# Patient Record
Sex: FEMALE | Race: WHITE | URBAN - NONMETROPOLITAN AREA
[De-identification: names, ages, dates, MRNs, and addresses within clinical notes are randomized per-mention and may not be internally consistent; named-entity substitution may affect disease eponyms.]

---

## 2017-10-26 ENCOUNTER — OPTICAL OFFICE (OUTPATIENT)
Dept: URBAN - NONMETROPOLITAN AREA CLINIC 4 | Facility: CLINIC | Age: 47
Setting detail: OPHTHALMOLOGY
End: 2017-10-26
Payer: COMMERCIAL

## 2017-10-26 ENCOUNTER — RX ONLY (RX ONLY)
Age: 47
End: 2017-10-26

## 2017-10-26 ENCOUNTER — DOCTOR'S OFFICE (OUTPATIENT)
Dept: URBAN - NONMETROPOLITAN AREA CLINIC 1 | Facility: CLINIC | Age: 47
Setting detail: OPHTHALMOLOGY
End: 2017-10-26
Payer: COMMERCIAL

## 2017-10-26 DIAGNOSIS — H10.13: ICD-10-CM

## 2017-10-26 DIAGNOSIS — H25.13: ICD-10-CM

## 2017-10-26 DIAGNOSIS — H52.223: ICD-10-CM

## 2017-10-26 DIAGNOSIS — G44.1: ICD-10-CM

## 2017-10-26 DIAGNOSIS — H52.4: ICD-10-CM

## 2017-10-26 PROCEDURE — V2020 VISION SVCS FRAMES PURCHASES: HCPCS | Performed by: OPTOMETRIST

## 2017-10-26 PROCEDURE — 92015 DETERMINE REFRACTIVE STATE: CPT | Performed by: OPTOMETRIST

## 2017-10-26 PROCEDURE — V2025 EYEGLASSES DELUX FRAMES: HCPCS | Performed by: OPTOMETRIST

## 2017-10-26 PROCEDURE — 92004 COMPRE OPH EXAM NEW PT 1/>: CPT | Performed by: OPTOMETRIST

## 2017-10-26 PROCEDURE — V2202 LENS SPHERE BIFOCAL 7.12-20.: HCPCS | Performed by: OPTOMETRIST

## 2017-10-26 PROCEDURE — V2784 LENS POLYCARB OR EQUAL: HCPCS | Performed by: OPTOMETRIST

## 2017-10-26 ASSESSMENT — REFRACTION_OUTSIDERX
OS_VA2: 20/20
OS_VA1: 20/20
OD_ADD: +1.75
OD_AXIS: 090
OD_CYLINDER: -0.75
OD_VA3: 20/
OS_CYLINDER: -0.75
OS_AXIS: 090
OD_VA1: 20/20
OS_ADD: +1.75
OD_VA2: 20/20
OU_VA: 20/20-2
OS_SPHERE: PL
OD_SPHERE: PL
OS_VA3: 20/

## 2017-10-26 ASSESSMENT — REFRACTION_CURRENTRX
OS_OVR_VA: 20/
OS_SPHERE: +1.75
OD_VPRISM_DIRECTION: SV
OS_OVR_VA: 20/
OD_OVR_VA: 20/
OD_OVR_VA: 20/
OD_SPHERE: +1.75
OD_OVR_VA: 20/
OS_OVR_VA: 20/
OS_VPRISM_DIRECTION: SV

## 2017-10-26 ASSESSMENT — CONFRONTATIONAL VISUAL FIELD TEST (CVF)
OS_FINDINGS: FULL
OD_FINDINGS: FULL

## 2017-10-26 ASSESSMENT — REFRACTION_MANIFEST
OU_VA: 20/
OD_VA3: 20/
OS_VA3: 20/
OD_VA3: 20/
OS_VA2: 20/
OD_VA1: 20/
OS_VA3: 20/
OU_VA: 20/
OS_VA1: 20/
OD_VA1: 20/
OS_VA2: 20/
OD_VA2: 20/
OD_VA2: 20/
OS_VA1: 20/

## 2017-10-26 ASSESSMENT — REFRACTION_AUTOREFRACTION
OD_AXIS: 090
OS_AXIS: 091
OS_SPHERE: 0.00
OD_CYLINDER: -0.75
OS_CYLINDER: -0.75
OD_SPHERE: +0.50

## 2017-10-26 ASSESSMENT — SPHEQUIV_DERIVED
OS_SPHEQUIV: -0.375
OD_SPHEQUIV: 0.125

## 2017-10-26 ASSESSMENT — VISUAL ACUITY
OD_BCVA: 20/40+2
OS_BCVA: 20/40+1

## 2017-12-27 ENCOUNTER — OPTICAL OFFICE (OUTPATIENT)
Dept: URBAN - NONMETROPOLITAN AREA CLINIC 4 | Facility: CLINIC | Age: 47
Setting detail: OPHTHALMOLOGY
End: 2017-12-27
Payer: COMMERCIAL

## 2017-12-27 DIAGNOSIS — H52.223: ICD-10-CM

## 2017-12-27 PROCEDURE — V2762 POLARIZATION, ANY LENS: HCPCS | Performed by: OPTOMETRIST

## 2017-12-27 PROCEDURE — V2020 VISION SVCS FRAMES PURCHASES: HCPCS | Performed by: OPTOMETRIST

## 2017-12-27 PROCEDURE — V2784 LENS POLYCARB OR EQUAL: HCPCS | Performed by: OPTOMETRIST

## 2017-12-27 PROCEDURE — V2202 LENS SPHERE BIFOCAL 7.12-20.: HCPCS | Performed by: OPTOMETRIST

## 2017-12-27 PROCEDURE — V2025 EYEGLASSES DELUX FRAMES: HCPCS | Performed by: OPTOMETRIST

## 2019-02-12 ENCOUNTER — DOCTOR'S OFFICE (OUTPATIENT)
Dept: URBAN - NONMETROPOLITAN AREA CLINIC 1 | Facility: CLINIC | Age: 49
Setting detail: OPHTHALMOLOGY
End: 2019-02-12
Payer: COMMERCIAL

## 2019-02-12 ENCOUNTER — OPTICAL OFFICE (OUTPATIENT)
Dept: URBAN - NONMETROPOLITAN AREA CLINIC 4 | Facility: CLINIC | Age: 49
Setting detail: OPHTHALMOLOGY
End: 2019-02-12
Payer: COMMERCIAL

## 2019-02-12 DIAGNOSIS — H40.033: ICD-10-CM

## 2019-02-12 DIAGNOSIS — H52.223: ICD-10-CM

## 2019-02-12 DIAGNOSIS — H10.13: ICD-10-CM

## 2019-02-12 DIAGNOSIS — G44.1: ICD-10-CM

## 2019-02-12 DIAGNOSIS — H25.13: ICD-10-CM

## 2019-02-12 DIAGNOSIS — H52.4: ICD-10-CM

## 2019-02-12 PROCEDURE — 92132 CPTRZD OPH DX IMG ANT SGM: CPT | Performed by: OPTOMETRIST

## 2019-02-12 PROCEDURE — 92015 DETERMINE REFRACTIVE STATE: CPT | Performed by: OPTOMETRIST

## 2019-02-12 PROCEDURE — V2203 LENS SPHCYL BIFOCAL 4.00D/.1: HCPCS | Performed by: OPTOMETRIST

## 2019-02-12 PROCEDURE — V2020 VISION SVCS FRAMES PURCHASES: HCPCS | Performed by: OPTOMETRIST

## 2019-02-12 PROCEDURE — V2202 LENS SPHERE BIFOCAL 7.12-20.: HCPCS | Performed by: OPTOMETRIST

## 2019-02-12 PROCEDURE — 92310 CONTACT LENS FITTING OU: CPT | Performed by: OPTOMETRIST

## 2019-02-12 PROCEDURE — 92014 COMPRE OPH EXAM EST PT 1/>: CPT | Performed by: OPTOMETRIST

## 2019-02-12 PROCEDURE — V2784 LENS POLYCARB OR EQUAL: HCPCS | Performed by: OPTOMETRIST

## 2019-02-12 ASSESSMENT — REFRACTION_CURRENTRX
OD_CYLINDER: -0.75
OS_SPHERE: PLANO
OD_OVR_VA: 20/
OD_OVR_VA: 20/
OS_OVR_VA: 20/
OD_ADD: +1.75
OD_SPHERE: PLANO
OS_ADD: +1.75
OS_AXIS: 092
OD_AXIS: 094
OD_OVR_VA: 20/
OS_CYLINDER: -0.75
OS_OVR_VA: 20/
OS_VPRISM_DIRECTION: BF
OD_VPRISM_DIRECTION: BF
OS_OVR_VA: 20/

## 2019-02-12 ASSESSMENT — CONFRONTATIONAL VISUAL FIELD TEST (CVF)
OS_FINDINGS: FULL
OD_FINDINGS: FULL

## 2019-02-12 ASSESSMENT — REFRACTION_MANIFEST
OS_CYLINDER: -1.25
OD_VA3: 20/
OD_CYLINDER: -1.00
OS_AXIS: 095
OS_VA3: 20/
OD_ADD: +2.00
OS_VA2: 20/20
OD_AXIS: 090
OD_VA2: 20/
OD_VA3: 20/
OD_SPHERE: +0.50
OS_VA1: 20/
OD_VA1: 20/20
OS_ADD: +2.00
OS_SPHERE: +0.25
OU_VA: 20/20-2
OS_VA1: 20/20
OD_VA1: 20/
OD_VA2: 20/20
OS_VA2: 20/
OS_VA3: 20/
OU_VA: 20/

## 2019-02-12 ASSESSMENT — VISUAL ACUITY
OS_BCVA: 20/25-1
OD_BCVA: 20/25+2

## 2019-02-12 ASSESSMENT — SPHEQUIV_DERIVED
OS_SPHEQUIV: -0.375
OD_SPHEQUIV: 0.25
OS_SPHEQUIV: -0.375
OD_SPHEQUIV: 0

## 2019-02-12 ASSESSMENT — REFRACTION_AUTOREFRACTION
OD_AXIS: 062
OD_CYLINDER: -0.50
OS_CYLINDER: -1.25
OS_AXIS: 098
OS_SPHERE: +0.25
OD_SPHERE: +0.50

## 2019-04-25 ENCOUNTER — OPTICAL OFFICE (OUTPATIENT)
Dept: URBAN - NONMETROPOLITAN AREA CLINIC 4 | Facility: CLINIC | Age: 49
Setting detail: OPHTHALMOLOGY
End: 2019-04-25
Payer: COMMERCIAL

## 2019-04-25 DIAGNOSIS — H52.223: ICD-10-CM

## 2019-04-25 PROCEDURE — V2203 LENS SPHCYL BIFOCAL 4.00D/.1: HCPCS | Performed by: OPTOMETRIST

## 2019-04-25 PROCEDURE — V2020 VISION SVCS FRAMES PURCHASES: HCPCS | Performed by: OPTOMETRIST

## 2019-04-25 PROCEDURE — V2202 LENS SPHERE BIFOCAL 7.12-20.: HCPCS | Performed by: OPTOMETRIST

## 2019-04-25 PROCEDURE — V2762 POLARIZATION, ANY LENS: HCPCS | Performed by: OPTOMETRIST

## 2019-08-12 ENCOUNTER — DOCTOR'S OFFICE (OUTPATIENT)
Dept: URBAN - NONMETROPOLITAN AREA CLINIC 1 | Facility: CLINIC | Age: 49
Setting detail: OPHTHALMOLOGY
End: 2019-08-12
Payer: COMMERCIAL

## 2019-08-12 DIAGNOSIS — H25.13: ICD-10-CM

## 2019-08-12 DIAGNOSIS — H40.033: ICD-10-CM

## 2019-08-12 DIAGNOSIS — G44.1: ICD-10-CM

## 2019-08-12 PROCEDURE — 92012 INTRM OPH EXAM EST PATIENT: CPT | Performed by: OPTOMETRIST

## 2019-08-12 PROCEDURE — 92132 CPTRZD OPH DX IMG ANT SGM: CPT | Performed by: OPTOMETRIST

## 2019-08-12 ASSESSMENT — REFRACTION_MANIFEST
OS_VA3: 20/
OS_AXIS: 095
OS_ADD: +2.00
OS_VA2: 20/20
OS_VA1: 20/
OS_VA3: 20/
OD_VA1: 20/20
OS_VA2: 20/
OD_CYLINDER: -1.00
OU_VA: 20/20-2
OD_AXIS: 090
OD_VA2: 20/
OS_SPHERE: +0.25
OD_VA1: 20/
OD_VA2: 20/20
OS_VA1: 20/20
OD_ADD: +2.00
OU_VA: 20/
OD_VA3: 20/
OS_CYLINDER: -1.25
OD_SPHERE: +0.50
OD_VA3: 20/

## 2019-08-12 ASSESSMENT — SPHEQUIV_DERIVED
OS_SPHEQUIV: -0.375
OD_SPHEQUIV: -0.125
OS_SPHEQUIV: -0.25
OD_SPHEQUIV: 0

## 2019-08-12 ASSESSMENT — REFRACTION_CURRENTRX
OD_AXIS: 094
OD_SPHERE: PLANO
OS_CYLINDER: -0.75
OS_OVR_VA: 20/
OD_OVR_VA: 20/
OS_VPRISM_DIRECTION: BF
OS_OVR_VA: 20/
OD_VPRISM_DIRECTION: BF
OD_ADD: +1.75
OD_CYLINDER: -0.75
OD_OVR_VA: 20/
OS_OVR_VA: 20/
OD_OVR_VA: 20/
OS_ADD: +1.75
OS_AXIS: 092
OS_SPHERE: PLANO

## 2019-08-12 ASSESSMENT — CONFRONTATIONAL VISUAL FIELD TEST (CVF)
OS_FINDINGS: FULL
OD_FINDINGS: FULL

## 2019-08-12 ASSESSMENT — REFRACTION_AUTOREFRACTION
OS_AXIS: 102
OS_SPHERE: 0.00
OD_CYLINDER: -0.75
OD_SPHERE: +0.25
OS_CYLINDER: -0.50
OD_AXIS: 097

## 2019-08-12 ASSESSMENT — VISUAL ACUITY
OD_BCVA: 20/20-2
OS_BCVA: 20/20-2

## 2019-11-21 ENCOUNTER — TELEPHONE (OUTPATIENT)
Dept: OBGYN CLINIC | Facility: CLINIC | Age: 49
End: 2019-11-21

## 2019-11-22 ENCOUNTER — TELEPHONE (OUTPATIENT)
Dept: OBGYN CLINIC | Facility: HOSPITAL | Age: 49
End: 2019-11-22

## 2019-11-22 NOTE — TELEPHONE ENCOUNTER
Patient is seeing Dr Mckeon Frames  She received her referral from her PCP but it only states it is active for 10 days  I informed her she would need to contact her PCP to have it updated since her appt is 12/13  She is going to call the office

## 2019-11-25 ENCOUNTER — TELEPHONE (OUTPATIENT)
Dept: OBGYN CLINIC | Facility: HOSPITAL | Age: 49
End: 2019-11-25

## 2019-11-25 NOTE — TELEPHONE ENCOUNTER
The patient is not scheduled until 12/13  Fax should be coming over sometime today   Just wanted to let you know

## 2019-11-25 NOTE — TELEPHONE ENCOUNTER
Good Morning, Sena; I have not seen anything come threw  I also see this pt scheduled at this time for today nor Friday?

## 2019-12-12 ENCOUNTER — TELEPHONE (OUTPATIENT)
Dept: OBGYN CLINIC | Facility: CLINIC | Age: 49
End: 2019-12-12

## 2019-12-12 RX ORDER — HYDROXYZINE HYDROCHLORIDE 25 MG/1
TABLET, FILM COATED ORAL
COMMUNITY

## 2019-12-12 RX ORDER — KETOCONAZOLE 20 MG/G
CREAM TOPICAL
COMMUNITY
Start: 2017-07-05

## 2019-12-12 RX ORDER — METHYLPREDNISOLONE 4 MG/1
TABLET ORAL
COMMUNITY
Start: 2019-11-19

## 2019-12-12 RX ORDER — CITALOPRAM 20 MG/1
TABLET ORAL
COMMUNITY
Start: 2011-10-24

## 2019-12-12 RX ORDER — ESTRADIOL 0.1 MG/G
CREAM VAGINAL
COMMUNITY
Start: 2019-09-19

## 2019-12-12 RX ORDER — LORAZEPAM 1 MG/1
TABLET ORAL
COMMUNITY
Start: 2011-10-24

## 2019-12-12 RX ORDER — FLUTICASONE PROPIONATE 220 UG/1
AEROSOL, METERED RESPIRATORY (INHALATION)
COMMUNITY
Start: 2018-06-18

## 2019-12-12 RX ORDER — MULTIVITAMIN
CAPSULE ORAL
COMMUNITY

## 2019-12-12 RX ORDER — OMEPRAZOLE 40 MG/1
CAPSULE, DELAYED RELEASE ORAL
COMMUNITY
Start: 2017-04-20

## 2019-12-12 RX ORDER — ZINC GLUCONATE 50 MG
50 TABLET ORAL
COMMUNITY

## 2019-12-12 RX ORDER — UBIDECARENONE 10 MG
100 CAPSULE ORAL DAILY
COMMUNITY

## 2019-12-12 RX ORDER — UBIDECARENONE 75 MG
CAPSULE ORAL
COMMUNITY
Start: 2013-04-22

## 2019-12-12 RX ORDER — DICLOFENAC SODIUM 75 MG/1
TABLET, DELAYED RELEASE ORAL
COMMUNITY
Start: 2016-06-06

## 2019-12-12 RX ORDER — METOCLOPRAMIDE 5 MG/1
TABLET ORAL
COMMUNITY
Start: 2016-11-29

## 2019-12-12 RX ORDER — TRIAMCINOLONE ACETONIDE 1 MG/G
CREAM TOPICAL
COMMUNITY
Start: 2019-09-30

## 2019-12-13 ENCOUNTER — APPOINTMENT (OUTPATIENT)
Dept: RADIOLOGY | Facility: CLINIC | Age: 49
End: 2019-12-13
Payer: COMMERCIAL

## 2019-12-13 ENCOUNTER — OFFICE VISIT (OUTPATIENT)
Dept: OBGYN CLINIC | Facility: CLINIC | Age: 49
End: 2019-12-13
Payer: COMMERCIAL

## 2019-12-13 VITALS
SYSTOLIC BLOOD PRESSURE: 112 MMHG | WEIGHT: 166.89 LBS | DIASTOLIC BLOOD PRESSURE: 76 MMHG | HEIGHT: 62 IN | BODY MASS INDEX: 30.71 KG/M2

## 2019-12-13 DIAGNOSIS — M54.16 LUMBAR RADICULOPATHY: ICD-10-CM

## 2019-12-13 DIAGNOSIS — M54.16 LUMBAR RADICULOPATHY: Primary | ICD-10-CM

## 2019-12-13 DIAGNOSIS — M47.26 OTHER SPONDYLOSIS WITH RADICULOPATHY, LUMBAR REGION: ICD-10-CM

## 2019-12-13 DIAGNOSIS — M79.604 RIGHT LEG PAIN: ICD-10-CM

## 2019-12-13 PROCEDURE — 99204 OFFICE O/P NEW MOD 45 MIN: CPT | Performed by: ORTHOPAEDIC SURGERY

## 2019-12-13 PROCEDURE — 72110 X-RAY EXAM L-2 SPINE 4/>VWS: CPT

## 2019-12-13 NOTE — PROGRESS NOTES
ASSESSMENT/PLAN:    Diagnoses and all orders for this visit:    Right leg pain      Lumbar radiculopathy    Lumbar spondylosis with radiculopathy      Plan:  I had a lengthy discussion with Jackelin Morillo and her   At this time, I think a course of physical therapy would be appropriate  She will follow-up with Dr Charis Hanson in 4-6 weeks  If symptoms are not responding, additional workup may be necessary  She should continue the diclofenac which she has been taking for a prolonged period of time, several years  She underwent a trial of Medrol Dosepak about 2 weeks ago without benefit and I would not recommend repeating this, since it did not help  She may add Tylenol to her over-the-counter regimen  I have encouraged her to contact the office if questions or concerns arise  Return for 4 - 6 weeks with Dr Charis Hanson       _____________________________________________________  CHIEF COMPLAINT:  Chief Complaint   Patient presents with    Right Knee - Pain    Right Hip - Pain         SUBJECTIVE:  Nicolasa Ferreira is a 51y o  year old female who presents for evaluation of right lower extremity pain  She states the pain begins at her hip and radiates distally to her ankle, occasionally into her foot  She denies lower extremity paresthesias  Symptoms have been present now for about 2 months  She denies any injury triggering her symptoms  Localizing her pain, she circumferentially grabs the proximal thigh, traces pain down to the medial, distal thigh and then radiating around to the anterior lower leg and then the lateral aspect of the distal lower leg and ankle  She does note some back pain and has had some increase in her back pain as of the last week or so  She states that she has previously been treated for sciatica and does not see feel the symptoms are similar  An MRI of her knee was obtained  She was referred for orthopedic consultation and treatment  She has had no specific treatment    She was offered an injection by her primary care physician but shows to seek orthopedic evaluation prior to injection  She was treated several years ago for symptoms of a similar nature that was ultimately diagnosed as a result of her dropped arch        PAST MEDICAL HISTORY:  Past Medical History:   Diagnosis Date    Beta 0 thalassemia (HCC)     Esophageal abnormality     Gets scopes every 4 months       PAST SURGICAL HISTORY:  Past Surgical History:   Procedure Laterality Date    ELBOW SURGERY      ESOPHAGOSCOPY      FOOT SURGERY      HAND SURGERY      HYSTERECTOMY      TRIGGER FINGER RELEASE      WISDOM TOOTH EXTRACTION         FAMILY HISTORY:  Family History   Problem Relation Age of Onset    Hypertension Mother     Dementia Mother     Osteoarthritis Mother     Alzheimer's disease Mother     Esophageal cancer Father        SOCIAL HISTORY:  Social History     Tobacco Use    Smoking status: Current Every Day Smoker     Packs/day: 1 00     Years: 30 00     Pack years: 30 00     Types: Cigarettes    Smokeless tobacco: Current User   Substance Use Topics    Alcohol use: Yes     Frequency: Monthly or less    Drug use: Not on file       MEDICATIONS:    Current Outpatient Medications:     Ascorbic Acid, Vitamin C, (VITAMIN C) 100 MG tablet, Take 500 mg by mouth daily, Disp: , Rfl:     Calcium Carb-Cholecalciferol 1000-800 MG-UNIT TABS, , Disp: , Rfl:     citalopram (CeleXA) 20 mg tablet, Take by mouth, Disp: , Rfl:     Coenzyme Q10 10 MG capsule, Take 100 mg by mouth daily, Disp: , Rfl:     cyanocobalamin (VITAMIN B-12) 100 mcg tablet, Take by mouth, Disp: , Rfl:     diclofenac (VOLTAREN) 75 mg EC tablet, Take by mouth, Disp: , Rfl:     estradiol (ESTRACE) 0 1 mg/g vaginal cream, , Disp: , Rfl:     fluticasone (FLOVENT HFA) 220 mcg/act inhaler, Take by mouth, Disp: , Rfl:     ketoconazole (NIZORAL) 2 % cream, , Disp: , Rfl:     LORazepam (ATIVAN) 1 mg tablet, Take by mouth, Disp: , Rfl:     lysine 500 MG TABS, Take by mouth, Disp: , Rfl:     methylPREDNISolone 4 MG tablet therapy pack, follow package directions, Disp: , Rfl:     metoclopramide (REGLAN) 5 mg tablet, Take by mouth, Disp: , Rfl:     Multiple Vitamin (MULTIVITAMIN) capsule, Take by mouth, Disp: , Rfl:     omeprazole (PriLOSEC) 40 MG capsule, , Disp: , Rfl:     Zinc 50 MG TABS, Take 50 mg by mouth, Disp: , Rfl:     conjugated estrogens (PREMARIN) vaginal cream, , Disp: , Rfl:     hydrOXYzine HCL (ATARAX) 25 mg tablet, Take by mouth, Disp: , Rfl:     Pediatric Multivitamins-Fl (MULTIVITAMINS/FL PO), Take by mouth, Disp: , Rfl:     Psyllium 100 % POWD, Take by mouth, Disp: , Rfl:     triamcinolone (KENALOG) 0 1 % cream, , Disp: , Rfl:     ALLERGIES:  Allergies   Allergen Reactions    Ciprofloxacin     Sulfa Antibiotics        Review of systems:   Constitutional: Negative for fatigue, fever or loss of apetite  HENT: Negative  Respiratory: Negative for shortness of breath, dyspnea  Cardiovascular: Negative for chest pain/tightness  Gastrointestinal: Negative for abdominal pain, N/V  Endocrine: Negative for cold/heat intolerance, unexplained weight loss/gain  Genitourinary: Negative for flank pain, dysuria, hematuria  Musculoskeletal:  Positive as in the HPI   Skin: Negative for rash  Neurological:  Negative  Psychiatric/Behavioral: Negative for agitation  _____________________________________________________  PHYSICAL EXAMINATION:    Blood pressure 112/76, height 5' 2" (1 575 m), weight 75 7 kg (166 lb 14 2 oz)  General: well developed and well nourished, alert, oriented times 3 and appears comfortable  Psychiatric: Normal  HEENT: Benign  Cardiovascular: Regular    Pulmonary: No wheezing or stridor  Abdomen: Soft, Nontender  Skin: No masses, erythema, lacerations, fluctation, ulcerations  Neurovascular: Motor and sensory exams are grossly intact  DTRs 2/4 bilateral lower extremities    Babinski's downgoing and no clonus noted  Pulses palpable  MUSCULOSKELETAL EXAMINATION:    The thoracolumbar exam demonstrates excellent range of motion  She does complain of mild pain in the posterior thigh and knee during thoracolumbar flexion  She had no complaints with extension  She complains of mild right sided lateral thigh pain with right rotation and no complaints during left rotation  She has mild back pain with side bending  Lumbar spinous processes and paraspinal muscles are nontender  SI joints are nontender  Straight leg raising was positive at 45° for posterior knee and distal thigh pain, radiating proximally and distally at 60°  Symptoms are markedly exacerbated by passive ankle dorsiflexion and completely alleviated by knee flexion  Straight leg raising on the left was negative  Sitting root sign was negative, bilaterally  The bilateral lower extremity exam demonstrates excellent range of motion at all joints  Her right knee exam demonstrates no pain during range of motion, no crepitus, no deformity  Ligaments are stable  She does complain of some mild tenderness over the medial knee including the joint line and tibial plateau  The lateral knee is nontender  Alexa's test and Apley's compression/distraction tests are negative  The left knee exam is benign  The bilateral hip exam is grossly benign although she did complain of mild lateral thigh pain with hip rotation at 90° of flexion on the right and had mild tenderness at the right greater trochanteric bursa       _____________________________________________________  STUDIES REVIEWED:  X-rays of the lumbar spine demonstrate nearly complete loss of the L5-S1 disc space and facet arthropathy          Amarilis Craft

## 2020-01-21 ENCOUNTER — TELEPHONE (OUTPATIENT)
Dept: OBGYN CLINIC | Facility: CLINIC | Age: 50
End: 2020-01-21

## 2020-01-21 NOTE — TELEPHONE ENCOUNTER
Left pt voicemail stating if she could reschedule for next week due to the Dr being out of the office tomorrow 1/22

## 2020-01-23 DIAGNOSIS — M47.26 OTHER SPONDYLOSIS WITH RADICULOPATHY, LUMBAR REGION: ICD-10-CM

## 2020-01-23 DIAGNOSIS — M54.16 LUMBAR RADICULOPATHY: Primary | ICD-10-CM

## 2020-05-20 ENCOUNTER — DOCTOR'S OFFICE (OUTPATIENT)
Dept: URBAN - NONMETROPOLITAN AREA CLINIC 1 | Facility: CLINIC | Age: 50
Setting detail: OPHTHALMOLOGY
End: 2020-05-20
Payer: COMMERCIAL

## 2020-05-20 ENCOUNTER — OPTICAL OFFICE (OUTPATIENT)
Dept: URBAN - NONMETROPOLITAN AREA CLINIC 4 | Facility: CLINIC | Age: 50
Setting detail: OPHTHALMOLOGY
End: 2020-05-20
Payer: COMMERCIAL

## 2020-05-20 VITALS — HEIGHT: 55 IN

## 2020-05-20 DIAGNOSIS — H52.223: ICD-10-CM

## 2020-05-20 DIAGNOSIS — H52.4: ICD-10-CM

## 2020-05-20 PROCEDURE — V2020 VISION SVCS FRAMES PURCHASES: HCPCS | Performed by: OPTOMETRIST

## 2020-05-20 PROCEDURE — V2202 LENS SPHERE BIFOCAL 7.12-20.: HCPCS | Performed by: OPTOMETRIST

## 2020-05-20 PROCEDURE — V2025 EYEGLASSES DELUX FRAMES: HCPCS | Performed by: OPTOMETRIST

## 2020-05-20 PROCEDURE — V2203 LENS SPHCYL BIFOCAL 4.00D/.1: HCPCS | Performed by: OPTOMETRIST

## 2020-05-20 PROCEDURE — V2784 LENS POLYCARB OR EQUAL: HCPCS | Performed by: OPTOMETRIST

## 2020-05-20 PROCEDURE — 92015 DETERMINE REFRACTIVE STATE: CPT | Performed by: OPTOMETRIST

## 2020-05-20 ASSESSMENT — REFRACTION_CURRENTRX
OD_ADD: +2.00
OD_OVR_VA: 20/
OD_SPHERE: +0.50
OS_SPHERE: +0.25
OD_CYLINDER: -1.00
OS_ADD: +2.00
OS_CYLINDER: -1.25
OD_VPRISM_DIRECTION: BF
OS_VPRISM_DIRECTION: BF
OS_AXIS: 092
OS_OVR_VA: 20/
OD_AXIS: 093

## 2020-05-20 ASSESSMENT — REFRACTION_MANIFEST
OD_VA2: 20/20
OS_VA2: 20/20
OD_VA1: 20/20
OS_VA1: 20/20
OD_SPHERE: +0.50
OS_SPHERE: PLANO
OD_CYLINDER: -0.75
OU_VA: 20/20-2
OD_AXIS: 090
OD_ADD: +2.50
OS_CYLINDER: -0.75
OS_ADD: +2.50
OS_AXIS: 095

## 2020-05-20 ASSESSMENT — SPHEQUIV_DERIVED
OD_SPHEQUIV: 0.125
OD_SPHEQUIV: 0.125
OS_SPHEQUIV: -0.375

## 2020-05-20 ASSESSMENT — REFRACTION_AUTOREFRACTION
OD_SPHERE: +0.50
OS_AXIS: 088
OD_AXIS: 098
OS_SPHERE: 0.00
OD_CYLINDER: -0.75
OS_CYLINDER: -0.75

## 2020-05-20 ASSESSMENT — CONFRONTATIONAL VISUAL FIELD TEST (CVF)
OS_FINDINGS: FULL
OD_FINDINGS: FULL

## 2020-05-20 ASSESSMENT — VISUAL ACUITY
OD_BCVA: 20/25-1
OS_BCVA: 20/20

## 2020-06-09 ENCOUNTER — OPTICAL OFFICE (OUTPATIENT)
Dept: URBAN - NONMETROPOLITAN AREA CLINIC 4 | Facility: CLINIC | Age: 50
Setting detail: OPHTHALMOLOGY
End: 2020-06-09
Payer: COMMERCIAL

## 2020-06-09 DIAGNOSIS — H52.223: ICD-10-CM

## 2020-06-09 PROCEDURE — V2202 LENS SPHERE BIFOCAL 7.12-20.: HCPCS | Performed by: OPTOMETRIST

## 2020-06-09 PROCEDURE — V2203 LENS SPHCYL BIFOCAL 4.00D/.1: HCPCS | Performed by: OPTOMETRIST

## 2020-06-09 PROCEDURE — V2784 LENS POLYCARB OR EQUAL: HCPCS | Performed by: OPTOMETRIST

## 2020-06-09 PROCEDURE — V2762 POLARIZATION, ANY LENS: HCPCS | Performed by: OPTOMETRIST

## 2020-06-09 PROCEDURE — V2020 VISION SVCS FRAMES PURCHASES: HCPCS | Performed by: OPTOMETRIST

## 2020-07-20 ENCOUNTER — OPTICAL OFFICE (OUTPATIENT)
Dept: URBAN - NONMETROPOLITAN AREA CLINIC 4 | Facility: CLINIC | Age: 50
Setting detail: OPHTHALMOLOGY
End: 2020-07-20
Payer: COMMERCIAL

## 2020-07-20 DIAGNOSIS — H52.223: ICD-10-CM

## 2020-07-20 PROCEDURE — V2203 LENS SPHCYL BIFOCAL 4.00D/.1: HCPCS | Performed by: OPTOMETRIST

## 2020-07-20 PROCEDURE — V2762 POLARIZATION, ANY LENS: HCPCS | Performed by: OPTOMETRIST

## 2020-07-20 PROCEDURE — V2020 VISION SVCS FRAMES PURCHASES: HCPCS | Performed by: OPTOMETRIST

## 2020-08-26 ENCOUNTER — RX ONLY (RX ONLY)
Age: 50
End: 2020-08-26

## 2020-08-26 ENCOUNTER — DOCTOR'S OFFICE (OUTPATIENT)
Dept: URBAN - NONMETROPOLITAN AREA CLINIC 1 | Facility: CLINIC | Age: 50
Setting detail: OPHTHALMOLOGY
End: 2020-08-26
Payer: COMMERCIAL

## 2020-08-26 VITALS — HEIGHT: 55 IN

## 2020-08-26 DIAGNOSIS — H10.13: ICD-10-CM

## 2020-08-26 DIAGNOSIS — H25.13: ICD-10-CM

## 2020-08-26 DIAGNOSIS — H40.033: ICD-10-CM

## 2020-08-26 DIAGNOSIS — G44.1: ICD-10-CM

## 2020-08-26 PROCEDURE — 92014 COMPRE OPH EXAM EST PT 1/>: CPT | Performed by: OPTOMETRIST

## 2020-08-26 PROCEDURE — 92132 CPTRZD OPH DX IMG ANT SGM: CPT | Performed by: OPTOMETRIST

## 2020-08-26 ASSESSMENT — SPHEQUIV_DERIVED
OS_SPHEQUIV: 0.5
OD_SPHEQUIV: 0.5
OD_SPHEQUIV: 0.125

## 2020-08-26 ASSESSMENT — REFRACTION_CURRENTRX
OD_OVR_VA: 20/
OD_VPRISM_DIRECTION: BF
OS_VPRISM_DIRECTION: BF
OD_AXIS: 94
OS_ADD: +2.75
OS_CYLINDER: -0.75
OS_SPHERE: PLANO
OD_SPHERE: +0.50
OD_CYLINDER: -0.75
OS_AXIS: 95
OS_OVR_VA: 20/
OD_ADD: +2.75

## 2020-08-26 ASSESSMENT — REFRACTION_AUTOREFRACTION
OD_CYLINDER: -1.00
OS_AXIS: 90
OS_CYLINDER: -1.50
OD_SPHERE: +1.00
OD_AXIS: 95
OS_SPHERE: +1.25

## 2020-08-26 ASSESSMENT — REFRACTION_MANIFEST
OD_VA1: 20/20
OD_AXIS: 090
OD_CYLINDER: -0.75
OS_VA2: 20/20
OS_CYLINDER: -0.75
OS_ADD: +2.50
OD_VA2: 20/20
OU_VA: 20/20-2
OD_ADD: +2.50
OD_SPHERE: +0.50
OS_AXIS: 095
OS_VA1: 20/20
OS_SPHERE: PLANO

## 2020-08-26 ASSESSMENT — CONFRONTATIONAL VISUAL FIELD TEST (CVF)
OS_FINDINGS: FULL
OD_FINDINGS: FULL

## 2020-08-26 ASSESSMENT — VISUAL ACUITY
OS_BCVA: 20/25-1
OD_BCVA: 20/20-2

## 2020-09-30 ENCOUNTER — DOCTOR'S OFFICE (OUTPATIENT)
Dept: URBAN - NONMETROPOLITAN AREA CLINIC 1 | Facility: CLINIC | Age: 50
Setting detail: OPHTHALMOLOGY
End: 2020-09-30
Payer: COMMERCIAL

## 2020-09-30 VITALS — HEIGHT: 55 IN

## 2020-09-30 DIAGNOSIS — H25.13: ICD-10-CM

## 2020-09-30 DIAGNOSIS — G44.1: ICD-10-CM

## 2020-09-30 DIAGNOSIS — H10.13: ICD-10-CM

## 2020-09-30 DIAGNOSIS — H40.033: ICD-10-CM

## 2020-09-30 PROCEDURE — 92012 INTRM OPH EXAM EST PATIENT: CPT | Performed by: OPHTHALMOLOGY

## 2020-09-30 ASSESSMENT — REFRACTION_MANIFEST
OD_CYLINDER: -0.75
OD_VA1: 20/20
OD_AXIS: 090
OD_SPHERE: +0.50
OS_ADD: +2.50
OU_VA: 20/20-2
OS_VA2: 20/20
OD_ADD: +2.50
OS_VA1: 20/20
OS_SPHERE: PLANO
OD_VA2: 20/20
OS_AXIS: 095
OS_CYLINDER: -0.75

## 2020-09-30 ASSESSMENT — REFRACTION_CURRENTRX
OS_SPHERE: PLANO
OS_VPRISM_DIRECTION: BF
OS_ADD: +2.75
OD_ADD: +2.75
OS_OVR_VA: 20/
OD_SPHERE: +0.50
OD_OVR_VA: 20/
OD_AXIS: 94
OD_VPRISM_DIRECTION: BF
OS_AXIS: 95
OD_CYLINDER: -0.75
OS_CYLINDER: -0.75

## 2020-09-30 ASSESSMENT — SPHEQUIV_DERIVED
OD_SPHEQUIV: 0.125
OD_SPHEQUIV: 0.5
OS_SPHEQUIV: 0.5

## 2020-09-30 ASSESSMENT — REFRACTION_AUTOREFRACTION
OS_SPHERE: +1.25
OD_AXIS: 95
OD_SPHERE: +1.00
OD_CYLINDER: -1.00
OS_AXIS: 90
OS_CYLINDER: -1.50

## 2020-09-30 ASSESSMENT — CONFRONTATIONAL VISUAL FIELD TEST (CVF)
OD_FINDINGS: FULL
OS_FINDINGS: FULL

## 2020-09-30 ASSESSMENT — VISUAL ACUITY
OS_BCVA: 20/25
OD_BCVA: 20/20-2

## 2020-10-01 ENCOUNTER — AMBUL SURGICAL CARE (OUTPATIENT)
Dept: URBAN - NONMETROPOLITAN AREA SURGERY 1 | Facility: SURGERY | Age: 50
Setting detail: OPHTHALMOLOGY
End: 2020-10-01
Payer: COMMERCIAL

## 2020-10-01 DIAGNOSIS — H40.031: ICD-10-CM

## 2020-10-01 PROCEDURE — 66761 REVISION OF IRIS: CPT | Performed by: OPHTHALMOLOGY

## 2020-10-01 PROCEDURE — G8918 PT W/O PREOP ORDER IV AB PRO: HCPCS | Performed by: CLINIC/CENTER

## 2020-10-01 PROCEDURE — G8907 PT DOC NO EVENTS ON DISCHARG: HCPCS | Performed by: CLINIC/CENTER

## 2020-10-01 PROCEDURE — 66761 REVISION OF IRIS: CPT | Performed by: CLINIC/CENTER

## 2020-10-01 PROCEDURE — G8907 PT DOC NO EVENTS ON DISCHARG: HCPCS | Performed by: OPHTHALMOLOGY

## 2020-10-01 PROCEDURE — G8918 PT W/O PREOP ORDER IV AB PRO: HCPCS | Performed by: OPHTHALMOLOGY

## 2020-10-06 ENCOUNTER — AMBUL SURGICAL CARE (OUTPATIENT)
Dept: URBAN - NONMETROPOLITAN AREA SURGERY 1 | Facility: SURGERY | Age: 50
Setting detail: OPHTHALMOLOGY
End: 2020-10-06
Payer: COMMERCIAL

## 2020-10-06 DIAGNOSIS — H40.032: ICD-10-CM

## 2020-10-06 PROCEDURE — 66761 REVISION OF IRIS: CPT | Performed by: OPHTHALMOLOGY

## 2020-10-06 PROCEDURE — G8907 PT DOC NO EVENTS ON DISCHARG: HCPCS | Performed by: CLINIC/CENTER

## 2020-10-06 PROCEDURE — G8918 PT W/O PREOP ORDER IV AB PRO: HCPCS | Performed by: OPHTHALMOLOGY

## 2020-10-06 PROCEDURE — G8918 PT W/O PREOP ORDER IV AB PRO: HCPCS | Performed by: CLINIC/CENTER

## 2020-10-06 PROCEDURE — G8907 PT DOC NO EVENTS ON DISCHARG: HCPCS | Performed by: OPHTHALMOLOGY

## 2020-10-06 PROCEDURE — 66761 REVISION OF IRIS: CPT | Performed by: CLINIC/CENTER

## 2021-01-06 ENCOUNTER — DOCTOR'S OFFICE (OUTPATIENT)
Dept: URBAN - NONMETROPOLITAN AREA CLINIC 1 | Facility: CLINIC | Age: 51
Setting detail: OPHTHALMOLOGY
End: 2021-01-06
Payer: COMMERCIAL

## 2021-01-06 DIAGNOSIS — G44.1: ICD-10-CM

## 2021-01-06 DIAGNOSIS — H25.13: ICD-10-CM

## 2021-01-06 DIAGNOSIS — H40.033: ICD-10-CM

## 2021-01-06 DIAGNOSIS — H16.223: ICD-10-CM

## 2021-01-06 PROCEDURE — 92014 COMPRE OPH EXAM EST PT 1/>: CPT | Performed by: OPHTHALMOLOGY

## 2021-01-06 ASSESSMENT — REFRACTION_CURRENTRX
OD_CYLINDER: -0.75
OS_ADD: +2.75
OD_ADD: +2.75
OD_AXIS: 94
OS_CYLINDER: -0.75
OS_AXIS: 95
OS_SPHERE: PLANO
OS_OVR_VA: 20/
OD_VPRISM_DIRECTION: BF
OD_OVR_VA: 20/
OD_SPHERE: +0.50
OS_VPRISM_DIRECTION: BF

## 2021-01-06 ASSESSMENT — REFRACTION_AUTOREFRACTION
OS_AXIS: 099
OD_SPHERE: +0.50
OS_CYLINDER: -1.25
OD_AXIS: 088
OD_CYLINDER: -0.75
OS_SPHERE: 0.00

## 2021-01-06 ASSESSMENT — REFRACTION_MANIFEST
OS_CYLINDER: -0.75
OS_ADD: +2.50
OS_SPHERE: PLANO
OS_AXIS: 095
OD_VA1: 20/20
OD_CYLINDER: -0.75
OS_VA2: 20/20
OU_VA: 20/20-2
OD_SPHERE: +0.50
OD_ADD: +2.50
OD_VA2: 20/20
OS_VA1: 20/20
OD_AXIS: 090

## 2021-01-06 ASSESSMENT — DRY EYES - PHYSICIAN NOTES
OS_GENERALCOMMENTS: KSICCA
OD_GENERALCOMMENTS: KSICCA

## 2021-01-06 ASSESSMENT — VISUAL ACUITY
OD_BCVA: 20/20-2
OS_BCVA: 20/25

## 2021-01-06 ASSESSMENT — TONOMETRY
OS_IOP_MMHG: 17
OD_IOP_MMHG: 16

## 2021-01-06 ASSESSMENT — CONFRONTATIONAL VISUAL FIELD TEST (CVF)
OD_FINDINGS: FULL
OS_FINDINGS: FULL

## 2021-01-06 ASSESSMENT — SPHEQUIV_DERIVED
OD_SPHEQUIV: 0.125
OD_SPHEQUIV: 0.125
OS_SPHEQUIV: -0.625

## 2021-02-23 ENCOUNTER — DOCTOR'S OFFICE (OUTPATIENT)
Dept: URBAN - NONMETROPOLITAN AREA CLINIC 1 | Facility: CLINIC | Age: 51
Setting detail: OPHTHALMOLOGY
End: 2021-02-23
Payer: COMMERCIAL

## 2021-02-23 DIAGNOSIS — H52.223: ICD-10-CM

## 2021-02-23 DIAGNOSIS — H52.4: ICD-10-CM

## 2021-02-23 PROCEDURE — 92015 DETERMINE REFRACTIVE STATE: CPT | Performed by: OPTOMETRIST

## 2021-02-23 ASSESSMENT — REFRACTION_CURRENTRX
OS_AXIS: 092
OD_ADD: +2.75
OD_SPHERE: +0.50
OS_OVR_VA: 20/
OD_VPRISM_DIRECTION: BF
OS_ADD: +2.75
OS_CYLINDER: -0.75
OD_AXIS: 091
OS_VPRISM_DIRECTION: BF
OD_CYLINDER: -0.75
OD_OVR_VA: 20/
OS_SPHERE: PLANO

## 2021-02-23 ASSESSMENT — REFRACTION_AUTOREFRACTION
OS_CYLINDER: -1.00
OD_SPHERE: +0.50
OS_SPHERE: +0.50
OS_AXIS: 091
OD_CYLINDER: -0.75
OD_AXIS: 087

## 2021-02-23 ASSESSMENT — REFRACTION_MANIFEST
OD_VA1: 20/20
OD_SPHERE: +0.50
OS_VA2: 20/20
OS_ADD: +2.50
OS_CYLINDER: -1.00
OD_AXIS: 090
OS_AXIS: 095
OS_SPHERE: +0.25
OD_VA2: 20/20
OD_CYLINDER: -0.75
OS_VA1: 20/20-2
OU_VA: 20/20-2
OD_ADD: +2.50

## 2021-02-23 ASSESSMENT — SPHEQUIV_DERIVED
OS_SPHEQUIV: 0
OD_SPHEQUIV: 0.125
OD_SPHEQUIV: 0.125
OS_SPHEQUIV: -0.25

## 2021-02-23 ASSESSMENT — VISUAL ACUITY
OS_BCVA: 20/20-2
OD_BCVA: 20/25+1

## 2021-03-02 ENCOUNTER — OPTICAL OFFICE (OUTPATIENT)
Dept: URBAN - NONMETROPOLITAN AREA CLINIC 4 | Facility: CLINIC | Age: 51
Setting detail: OPHTHALMOLOGY
End: 2021-03-02
Payer: COMMERCIAL

## 2021-03-02 DIAGNOSIS — H52.223: ICD-10-CM

## 2021-03-02 PROCEDURE — V2203 LENS SPHCYL BIFOCAL 4.00D/.1: HCPCS | Performed by: OPTOMETRIST

## 2021-03-02 PROCEDURE — V2784 LENS POLYCARB OR EQUAL: HCPCS | Performed by: OPTOMETRIST

## 2021-03-02 PROCEDURE — V2020 VISION SVCS FRAMES PURCHASES: HCPCS | Performed by: OPTOMETRIST

## 2021-03-02 PROCEDURE — V2202 LENS SPHERE BIFOCAL 7.12-20.: HCPCS | Performed by: OPTOMETRIST

## 2021-04-12 ENCOUNTER — OPTICAL OFFICE (OUTPATIENT)
Dept: URBAN - NONMETROPOLITAN AREA CLINIC 4 | Facility: CLINIC | Age: 51
Setting detail: OPHTHALMOLOGY
End: 2021-04-12
Payer: COMMERCIAL

## 2021-04-12 DIAGNOSIS — H52.223: ICD-10-CM

## 2021-04-12 PROCEDURE — V2203 LENS SPHCYL BIFOCAL 4.00D/.1: HCPCS | Performed by: OPTOMETRIST

## 2021-04-12 PROCEDURE — V2784 LENS POLYCARB OR EQUAL: HCPCS | Performed by: OPTOMETRIST

## 2021-04-12 PROCEDURE — V2202 LENS SPHERE BIFOCAL 7.12-20.: HCPCS | Performed by: OPTOMETRIST

## 2021-04-12 PROCEDURE — V2020 VISION SVCS FRAMES PURCHASES: HCPCS | Performed by: OPTOMETRIST

## 2022-01-12 ENCOUNTER — DOCTOR'S OFFICE (OUTPATIENT)
Dept: URBAN - NONMETROPOLITAN AREA CLINIC 1 | Facility: CLINIC | Age: 52
Setting detail: OPHTHALMOLOGY
End: 2022-01-12
Payer: COMMERCIAL

## 2022-01-12 VITALS — HEIGHT: 55 IN

## 2022-01-12 DIAGNOSIS — H40.033: ICD-10-CM

## 2022-01-12 DIAGNOSIS — H16.223: ICD-10-CM

## 2022-01-12 DIAGNOSIS — G44.1: ICD-10-CM

## 2022-01-12 DIAGNOSIS — H25.13: ICD-10-CM

## 2022-01-12 PROCEDURE — 99214 OFFICE O/P EST MOD 30 MIN: CPT | Performed by: OPHTHALMOLOGY

## 2022-01-12 PROCEDURE — 92083 EXTENDED VISUAL FIELD XM: CPT | Performed by: OPHTHALMOLOGY

## 2022-01-12 PROCEDURE — 76514 ECHO EXAM OF EYE THICKNESS: CPT | Performed by: OPHTHALMOLOGY

## 2022-01-12 ASSESSMENT — SPHEQUIV_DERIVED
OS_SPHEQUIV: 0
OD_SPHEQUIV: 0.125
OS_SPHEQUIV: -0.25
OD_SPHEQUIV: 0.125

## 2022-01-12 ASSESSMENT — PACHYMETRY
OS_CT_CORRECTION: -2
OD_CT_CORRECTION: -2
OD_CT_UM: 576
OS_CT_UM: 574

## 2022-01-12 ASSESSMENT — REFRACTION_MANIFEST
OS_CYLINDER: -1.00
OD_VA1: 20/20
OS_VA2: 20/20
OD_ADD: +2.50
OS_SPHERE: +0.25
OS_ADD: +2.50
OD_SPHERE: +0.50
OD_CYLINDER: -0.75
OD_VA2: 20/20
OD_AXIS: 090
OS_VA1: 20/20-2
OS_AXIS: 095
OU_VA: 20/20-2

## 2022-01-12 ASSESSMENT — REFRACTION_CURRENTRX
OS_CYLINDER: -0.75
OD_OVR_VA: 20/
OD_ADD: +2.75
OS_VPRISM_DIRECTION: BF
OD_SPHERE: +0.50
OD_AXIS: 091
OS_AXIS: 092
OS_ADD: +2.75
OD_VPRISM_DIRECTION: BF
OS_OVR_VA: 20/
OS_SPHERE: PLANO
OD_CYLINDER: -0.75

## 2022-01-12 ASSESSMENT — DRY EYES - PHYSICIAN NOTES
OD_GENERALCOMMENTS: KSICCA
OS_GENERALCOMMENTS: KSICCA

## 2022-01-12 ASSESSMENT — REFRACTION_AUTOREFRACTION
OD_SPHERE: +0.50
OS_SPHERE: +0.50
OS_AXIS: 091
OD_AXIS: 087
OD_CYLINDER: -0.75
OS_CYLINDER: -1.00

## 2022-01-12 ASSESSMENT — TONOMETRY: OS_IOP_MMHG: 17

## 2022-01-12 ASSESSMENT — VISUAL ACUITY
OS_BCVA: 20/25+1
OD_BCVA: 20/25+1

## 2022-01-12 ASSESSMENT — CONFRONTATIONAL VISUAL FIELD TEST (CVF)
OS_FINDINGS: FULL
OD_FINDINGS: FULL

## 2022-02-15 ENCOUNTER — DOCTOR'S OFFICE (OUTPATIENT)
Dept: URBAN - NONMETROPOLITAN AREA CLINIC 1 | Facility: CLINIC | Age: 52
Setting detail: OPHTHALMOLOGY
End: 2022-02-15
Payer: COMMERCIAL

## 2022-02-15 ENCOUNTER — RX ONLY (RX ONLY)
Age: 52
End: 2022-02-15

## 2022-02-15 DIAGNOSIS — H16.223: ICD-10-CM

## 2022-02-15 DIAGNOSIS — H40.033: ICD-10-CM

## 2022-02-15 DIAGNOSIS — H25.13: ICD-10-CM

## 2022-02-15 DIAGNOSIS — G44.1: ICD-10-CM

## 2022-02-15 PROCEDURE — 92012 INTRM OPH EXAM EST PATIENT: CPT | Performed by: OPHTHALMOLOGY

## 2022-02-15 ASSESSMENT — REFRACTION_MANIFEST
OD_VA1: 20/20
OS_SPHERE: +0.25
OD_VA2: 20/20
OS_VA2: 20/20
OD_ADD: +2.50
OD_SPHERE: +0.50
OS_VA1: 20/20-2
OS_CYLINDER: -1.00
OU_VA: 20/20-2
OD_CYLINDER: -0.75
OS_ADD: +2.50
OS_AXIS: 095
OD_AXIS: 090

## 2022-02-15 ASSESSMENT — REFRACTION_CURRENTRX
OD_SPHERE: +0.50
OS_OVR_VA: 20/
OD_VPRISM_DIRECTION: BF
OD_CYLINDER: -0.75
OS_VPRISM_DIRECTION: BF
OD_OVR_VA: 20/
OS_ADD: +2.75
OS_AXIS: 092
OD_ADD: +2.75
OD_AXIS: 091
OS_CYLINDER: -0.75
OS_SPHERE: PLANO

## 2022-02-15 ASSESSMENT — PACHYMETRY
OD_CT_CORRECTION: -2
OS_CT_UM: 574
OD_CT_UM: 576
OS_CT_CORRECTION: -2

## 2022-02-15 ASSESSMENT — REFRACTION_AUTOREFRACTION
OD_SPHERE: +0.50
OD_CYLINDER: -0.75
OS_SPHERE: +0.50
OD_AXIS: 087
OS_AXIS: 091
OS_CYLINDER: -1.00

## 2022-02-15 ASSESSMENT — SPHEQUIV_DERIVED
OD_SPHEQUIV: 0.125
OS_SPHEQUIV: -0.25
OD_SPHEQUIV: 0.125
OS_SPHEQUIV: 0

## 2022-02-15 ASSESSMENT — TONOMETRY
OD_IOP_MMHG: 17
OS_IOP_MMHG: 16

## 2022-02-15 ASSESSMENT — CONFRONTATIONAL VISUAL FIELD TEST (CVF)
OD_FINDINGS: FULL
OS_FINDINGS: FULL

## 2022-02-15 ASSESSMENT — VISUAL ACUITY
OD_BCVA: 20/25-2
OS_BCVA: 20/30+1

## 2022-02-15 ASSESSMENT — DRY EYES - PHYSICIAN NOTES
OS_GENERALCOMMENTS: KSICCA
OD_GENERALCOMMENTS: KSICCA

## 2022-02-23 ENCOUNTER — DOCTOR'S OFFICE (OUTPATIENT)
Dept: URBAN - NONMETROPOLITAN AREA CLINIC 1 | Facility: CLINIC | Age: 52
Setting detail: OPHTHALMOLOGY
End: 2022-02-23
Payer: COMMERCIAL

## 2022-02-23 ENCOUNTER — OPTICAL OFFICE (OUTPATIENT)
Dept: URBAN - NONMETROPOLITAN AREA CLINIC 4 | Facility: CLINIC | Age: 52
Setting detail: OPHTHALMOLOGY
End: 2022-02-23
Payer: COMMERCIAL

## 2022-02-23 ENCOUNTER — OPTICAL OFFICE (OUTPATIENT)
Dept: URBAN - NONMETROPOLITAN AREA CLINIC 4 | Facility: CLINIC | Age: 52
Setting detail: OPHTHALMOLOGY
End: 2022-02-23

## 2022-02-23 DIAGNOSIS — H52.4: ICD-10-CM

## 2022-02-23 DIAGNOSIS — H52.223: ICD-10-CM

## 2022-02-23 PROCEDURE — V2762 POLARIZATION, ANY LENS: HCPCS | Performed by: OPTOMETRIST

## 2022-02-23 PROCEDURE — V2784 LENS POLYCARB OR EQUAL: HCPCS | Performed by: OPTOMETRIST

## 2022-02-23 PROCEDURE — V2202 LENS SPHERE BIFOCAL 7.12-20.: HCPCS | Performed by: OPTOMETRIST

## 2022-02-23 PROCEDURE — V2203 LENS SPHCYL BIFOCAL 4.00D/.1: HCPCS | Performed by: OPTOMETRIST

## 2022-02-23 PROCEDURE — 92015 DETERMINE REFRACTIVE STATE: CPT | Performed by: OPTOMETRIST

## 2022-02-23 PROCEDURE — V2020 VISION SVCS FRAMES PURCHASES: HCPCS | Performed by: OPTOMETRIST

## 2022-02-23 ASSESSMENT — REFRACTION_MANIFEST
OD_ADD: +2.50
OU_VA: 20/20-2
OD_SPHERE: +1.00
OD_CYLINDER: -1.25
OS_VA1: 20/20-2
OS_VA2: 20/20
OD_AXIS: 090
OS_ADD: +2.50
OD_VA2: 20/20
OD_VA1: 20/20
OS_SPHERE: +0.75
OS_CYLINDER: -1.00
OS_AXIS: 095

## 2022-02-23 ASSESSMENT — REFRACTION_CURRENTRX
OS_SPHERE: +0.25
OD_SPHERE: +0.50
OS_AXIS: 095
OD_CYLINDER: -0.75
OD_ADD: +2.50
OD_AXIS: 091
OS_CYLINDER: -1.00
OS_ADD: +2.50
OS_OVR_VA: 20/
OS_VPRISM_DIRECTION: BF
OD_VPRISM_DIRECTION: BF
OD_OVR_VA: 20/

## 2022-02-23 ASSESSMENT — REFRACTION_AUTOREFRACTION
OD_CYLINDER: -1.25
OD_AXIS: 080
OS_AXIS: 109
OS_SPHERE: +0.25
OS_CYLINDER: -0.75
OD_SPHERE: +1.00

## 2022-02-23 ASSESSMENT — SPHEQUIV_DERIVED
OD_SPHEQUIV: 0.375
OD_SPHEQUIV: 0.375
OS_SPHEQUIV: 0.25
OS_SPHEQUIV: -0.125

## 2022-02-23 ASSESSMENT — VISUAL ACUITY
OD_BCVA: 20/30+1
OS_BCVA: 20/30-1

## 2022-02-24 ENCOUNTER — OPTICAL OFFICE (OUTPATIENT)
Dept: URBAN - NONMETROPOLITAN AREA CLINIC 4 | Facility: CLINIC | Age: 52
Setting detail: OPHTHALMOLOGY
End: 2022-02-24
Payer: COMMERCIAL

## 2022-02-24 DIAGNOSIS — H52.223: ICD-10-CM

## 2022-02-24 PROCEDURE — V2020 VISION SVCS FRAMES PURCHASES: HCPCS | Performed by: OPTOMETRIST

## 2022-02-24 PROCEDURE — V2203 LENS SPHCYL BIFOCAL 4.00D/.1: HCPCS | Performed by: OPTOMETRIST

## 2022-02-24 PROCEDURE — V2202 LENS SPHERE BIFOCAL 7.12-20.: HCPCS | Performed by: OPTOMETRIST

## 2022-02-24 PROCEDURE — V2784 LENS POLYCARB OR EQUAL: HCPCS | Performed by: OPTOMETRIST

## 2022-05-18 ENCOUNTER — DOCTOR'S OFFICE (OUTPATIENT)
Dept: URBAN - NONMETROPOLITAN AREA CLINIC 1 | Facility: CLINIC | Age: 52
Setting detail: OPHTHALMOLOGY
End: 2022-05-18
Payer: COMMERCIAL

## 2022-05-18 VITALS — HEIGHT: 55 IN

## 2022-05-18 DIAGNOSIS — H25.013: ICD-10-CM

## 2022-05-18 DIAGNOSIS — H40.033: ICD-10-CM

## 2022-05-18 DIAGNOSIS — H43.811: ICD-10-CM

## 2022-05-18 DIAGNOSIS — H25.13: ICD-10-CM

## 2022-05-18 DIAGNOSIS — H16.223: ICD-10-CM

## 2022-05-18 PROCEDURE — 99213 OFFICE O/P EST LOW 20 MIN: CPT | Performed by: OPHTHALMOLOGY

## 2022-05-18 ASSESSMENT — PACHYMETRY
OD_CT_CORRECTION: -2
OS_CT_UM: 574
OD_CT_UM: 576
OS_CT_CORRECTION: -2

## 2022-05-18 ASSESSMENT — TONOMETRY
OS_IOP_MMHG: 12
OD_IOP_MMHG: 14

## 2022-05-18 ASSESSMENT — DRY EYES - PHYSICIAN NOTES
OS_GENERALCOMMENTS: KSICCA
OD_GENERALCOMMENTS: KSICCA

## 2022-05-18 ASSESSMENT — CONFRONTATIONAL VISUAL FIELD TEST (CVF)
OD_FINDINGS: FULL
OS_FINDINGS: FULL

## 2022-05-20 ASSESSMENT — REFRACTION_CURRENTRX
OS_VPRISM_DIRECTION: BF
OD_OVR_VA: 20/
OD_SPHERE: +0.50
OD_CYLINDER: -0.75
OS_CYLINDER: -1.00
OS_ADD: +2.50
OD_VPRISM_DIRECTION: BF
OS_AXIS: 095
OD_ADD: +2.50
OS_SPHERE: +0.25
OD_AXIS: 091
OS_OVR_VA: 20/

## 2022-05-20 ASSESSMENT — REFRACTION_MANIFEST
OD_AXIS: 090
OD_CYLINDER: -1.25
OD_SPHERE: +1.00
OU_VA: 20/20-2
OS_CYLINDER: -1.00
OD_ADD: +2.50
OS_VA2: 20/20
OS_VA1: 20/20-2
OS_SPHERE: +0.75
OS_AXIS: 095
OD_VA2: 20/20
OD_VA1: 20/20
OS_ADD: +2.50

## 2022-05-20 ASSESSMENT — REFRACTION_AUTOREFRACTION
OS_SPHERE: +1.00
OD_AXIS: 052
OD_CYLINDER: -0.50
OD_SPHERE: +0.50
OS_AXIS: 091
OS_CYLINDER: -1.75

## 2022-05-20 ASSESSMENT — SPHEQUIV_DERIVED
OD_SPHEQUIV: 0.25
OS_SPHEQUIV: 0.125
OS_SPHEQUIV: 0.25
OD_SPHEQUIV: 0.375

## 2022-05-20 ASSESSMENT — VISUAL ACUITY
OS_BCVA: 20/30-2
OD_BCVA: 20/30-2

## 2022-06-09 ENCOUNTER — DOCTOR'S OFFICE (OUTPATIENT)
Dept: URBAN - NONMETROPOLITAN AREA CLINIC 1 | Facility: CLINIC | Age: 52
Setting detail: OPHTHALMOLOGY
End: 2022-06-09
Payer: COMMERCIAL

## 2022-06-09 DIAGNOSIS — H25.11: ICD-10-CM

## 2022-06-09 DIAGNOSIS — H25.011: ICD-10-CM

## 2022-06-09 PROCEDURE — 92136 OPHTHALMIC BIOMETRY: CPT | Performed by: OPHTHALMOLOGY

## 2022-06-30 ENCOUNTER — AMBUL SURGICAL CARE (OUTPATIENT)
Dept: URBAN - NONMETROPOLITAN AREA SURGERY 1 | Facility: SURGERY | Age: 52
Setting detail: OPHTHALMOLOGY
End: 2022-06-30
Payer: COMMERCIAL

## 2022-06-30 DIAGNOSIS — H25.11: ICD-10-CM

## 2022-06-30 DIAGNOSIS — H25.011: ICD-10-CM

## 2022-06-30 PROCEDURE — G8907 PT DOC NO EVENTS ON DISCHARG: HCPCS | Performed by: CLINIC/CENTER

## 2022-06-30 PROCEDURE — 66984 XCAPSL CTRC RMVL W/O ECP: CPT | Performed by: OPHTHALMOLOGY

## 2022-06-30 PROCEDURE — 66984 XCAPSL CTRC RMVL W/O ECP: CPT | Performed by: CLINIC/CENTER

## 2022-06-30 PROCEDURE — G8907 PT DOC NO EVENTS ON DISCHARG: HCPCS | Performed by: OPHTHALMOLOGY

## 2022-06-30 PROCEDURE — G8918 PT W/O PREOP ORDER IV AB PRO: HCPCS | Performed by: OPHTHALMOLOGY

## 2022-06-30 PROCEDURE — G8918 PT W/O PREOP ORDER IV AB PRO: HCPCS | Performed by: CLINIC/CENTER

## 2022-07-01 ENCOUNTER — DOCTOR'S OFFICE (OUTPATIENT)
Dept: URBAN - NONMETROPOLITAN AREA CLINIC 1 | Facility: CLINIC | Age: 52
Setting detail: OPHTHALMOLOGY
End: 2022-07-01
Payer: COMMERCIAL

## 2022-07-01 ENCOUNTER — RX ONLY (RX ONLY)
Age: 52
End: 2022-07-01

## 2022-07-01 DIAGNOSIS — H25.013: ICD-10-CM

## 2022-07-01 DIAGNOSIS — Z96.1: ICD-10-CM

## 2022-07-01 DIAGNOSIS — H40.033: ICD-10-CM

## 2022-07-01 DIAGNOSIS — H25.12: ICD-10-CM

## 2022-07-01 DIAGNOSIS — H16.223: ICD-10-CM

## 2022-07-01 DIAGNOSIS — H43.811: ICD-10-CM

## 2022-07-01 DIAGNOSIS — H25.012: ICD-10-CM

## 2022-07-01 DIAGNOSIS — H25.13: ICD-10-CM

## 2022-07-01 PROCEDURE — 99024 POSTOP FOLLOW-UP VISIT: CPT | Performed by: OPHTHALMOLOGY

## 2022-07-01 PROCEDURE — 92136 OPHTHALMIC BIOMETRY: CPT | Performed by: OPHTHALMOLOGY

## 2022-07-01 ASSESSMENT — REFRACTION_MANIFEST
OD_VA1: 20/20
OD_AXIS: 090
OS_CYLINDER: -1.00
OS_VA1: 20/20-2
OU_VA: 20/20-2
OS_AXIS: 095
OD_ADD: +2.50
OS_SPHERE: +0.75
OD_SPHERE: +1.00
OS_VA2: 20/20
OS_ADD: +2.50
OD_VA2: 20/20
OD_CYLINDER: -1.25

## 2022-07-01 ASSESSMENT — VISUAL ACUITY
OS_BCVA: 20/40
OD_BCVA: 20/30-2

## 2022-07-01 ASSESSMENT — REFRACTION_CURRENTRX
OD_VPRISM_DIRECTION: BF
OS_ADD: +2.50
OD_SPHERE: +0.50
OD_AXIS: 091
OS_VPRISM_DIRECTION: BF
OD_OVR_VA: 20/
OS_CYLINDER: -1.00
OS_AXIS: 095
OD_ADD: +2.50
OD_CYLINDER: -0.75
OS_OVR_VA: 20/
OS_SPHERE: +0.25

## 2022-07-01 ASSESSMENT — REFRACTION_AUTOREFRACTION
OS_CYLINDER: -1.75
OD_CYLINDER: -0.25
OD_AXIS: 9
OS_AXIS: 091
OD_SPHERE: PL
OS_SPHERE: +1.00

## 2022-07-01 ASSESSMENT — SPHEQUIV_DERIVED
OS_SPHEQUIV: 0.25
OS_SPHEQUIV: 0.125
OD_SPHEQUIV: 0.375

## 2022-07-01 ASSESSMENT — DRY EYES - PHYSICIAN NOTES
OD_GENERALCOMMENTS: KSICCA
OS_GENERALCOMMENTS: KSICCA

## 2022-07-05 ENCOUNTER — DOCTOR'S OFFICE (OUTPATIENT)
Dept: URBAN - NONMETROPOLITAN AREA CLINIC 1 | Facility: CLINIC | Age: 52
Setting detail: OPHTHALMOLOGY
End: 2022-07-05
Payer: COMMERCIAL

## 2022-07-05 DIAGNOSIS — Z96.1: ICD-10-CM

## 2022-07-05 DIAGNOSIS — H43.811: ICD-10-CM

## 2022-07-05 PROCEDURE — 99024 POSTOP FOLLOW-UP VISIT: CPT | Performed by: OPHTHALMOLOGY

## 2022-07-05 PROCEDURE — 99213 OFFICE O/P EST LOW 20 MIN: CPT | Performed by: OPHTHALMOLOGY

## 2022-07-05 ASSESSMENT — REFRACTION_MANIFEST
OD_SPHERE: +1.00
OS_CYLINDER: -1.00
OS_VA2: 20/20
OS_ADD: +2.50
OD_CYLINDER: -1.25
OS_VA1: 20/20-2
OD_ADD: +2.50
OD_AXIS: 090
OU_VA: 20/20-2
OD_VA1: 20/20
OD_VA2: 20/20
OS_SPHERE: +0.75
OS_AXIS: 095

## 2022-07-05 ASSESSMENT — REFRACTION_CURRENTRX
OD_OVR_VA: 20/
OS_CYLINDER: -1.00
OS_ADD: +2.50
OS_AXIS: 095
OS_VPRISM_DIRECTION: BF
OD_VPRISM_DIRECTION: BF
OD_AXIS: 091
OS_SPHERE: +0.25
OS_OVR_VA: 20/
OD_SPHERE: +0.50
OD_CYLINDER: -0.75
OD_ADD: +2.50

## 2022-07-05 ASSESSMENT — SPHEQUIV_DERIVED
OS_SPHEQUIV: 0.25
OD_SPHEQUIV: 0.375
OS_SPHEQUIV: 0.125

## 2022-07-05 ASSESSMENT — REFRACTION_AUTOREFRACTION
OD_AXIS: 9
OS_CYLINDER: -1.75
OS_AXIS: 091
OS_SPHERE: +1.00
OD_SPHERE: PL
OD_CYLINDER: -0.25

## 2022-07-05 ASSESSMENT — DRY EYES - PHYSICIAN NOTES
OD_GENERALCOMMENTS: KSICCA
OS_GENERALCOMMENTS: KSICCA

## 2022-07-05 ASSESSMENT — VISUAL ACUITY
OS_BCVA: 20/25-2
OD_BCVA: 20/30-2

## 2022-07-07 ENCOUNTER — AMBUL SURGICAL CARE (OUTPATIENT)
Dept: URBAN - NONMETROPOLITAN AREA SURGERY 1 | Facility: SURGERY | Age: 52
Setting detail: OPHTHALMOLOGY
End: 2022-07-07
Payer: COMMERCIAL

## 2022-07-07 DIAGNOSIS — H25.012: ICD-10-CM

## 2022-07-07 DIAGNOSIS — H25.12: ICD-10-CM

## 2022-07-07 PROCEDURE — G8918 PT W/O PREOP ORDER IV AB PRO: HCPCS | Performed by: OPHTHALMOLOGY

## 2022-07-07 PROCEDURE — G8907 PT DOC NO EVENTS ON DISCHARG: HCPCS | Performed by: OPHTHALMOLOGY

## 2022-07-07 PROCEDURE — 66984 XCAPSL CTRC RMVL W/O ECP: CPT | Performed by: CLINIC/CENTER

## 2022-07-07 PROCEDURE — 66984 XCAPSL CTRC RMVL W/O ECP: CPT | Performed by: OPHTHALMOLOGY

## 2022-07-07 PROCEDURE — G8918 PT W/O PREOP ORDER IV AB PRO: HCPCS | Performed by: CLINIC/CENTER

## 2022-07-07 PROCEDURE — G8907 PT DOC NO EVENTS ON DISCHARG: HCPCS | Performed by: CLINIC/CENTER

## 2022-07-08 ENCOUNTER — DOCTOR'S OFFICE (OUTPATIENT)
Dept: URBAN - NONMETROPOLITAN AREA CLINIC 1 | Facility: CLINIC | Age: 52
Setting detail: OPHTHALMOLOGY
End: 2022-07-08

## 2022-07-08 DIAGNOSIS — Z96.1: ICD-10-CM

## 2022-07-08 PROCEDURE — 99024 POSTOP FOLLOW-UP VISIT: CPT | Performed by: OPTOMETRIST

## 2022-07-08 ASSESSMENT — SPHEQUIV_DERIVED
OS_SPHEQUIV: 1
OD_SPHEQUIV: 0.375
OS_SPHEQUIV: 0.25
OD_SPHEQUIV: -0.5

## 2022-07-08 ASSESSMENT — REFRACTION_AUTOREFRACTION
OD_SPHERE: -0.25
OD_AXIS: 070
OS_CYLINDER: -0.50
OD_CYLINDER: -0.50
OS_SPHERE: +1.25
OS_AXIS: 033

## 2022-07-08 ASSESSMENT — REFRACTION_CURRENTRX
OS_SPHERE: +0.25
OD_CYLINDER: -0.75
OS_VPRISM_DIRECTION: BF
OD_ADD: +2.50
OS_CYLINDER: -1.00
OD_SPHERE: +0.50
OD_AXIS: 091
OS_AXIS: 095
OS_OVR_VA: 20/
OD_VPRISM_DIRECTION: BF
OD_OVR_VA: 20/
OS_ADD: +2.50

## 2022-07-08 ASSESSMENT — PACHYMETRY
OD_CT_CORRECTION: -2
OS_CT_CORRECTION: -2
OS_CT_UM: 574
OD_CT_UM: 576

## 2022-07-08 ASSESSMENT — DRY EYES - PHYSICIAN NOTES
OS_GENERALCOMMENTS: KSICCA
OD_GENERALCOMMENTS: KSICCA

## 2022-07-08 ASSESSMENT — TONOMETRY
OS_IOP_MMHG: 17
OD_IOP_MMHG: 15

## 2022-07-08 ASSESSMENT — REFRACTION_MANIFEST
OD_SPHERE: +1.00
OS_VA2: 20/20
OS_VA1: 20/20-2
OD_CYLINDER: -1.25
OS_AXIS: 095
OD_AXIS: 090
OS_CYLINDER: -1.00
OD_ADD: +2.50
OS_ADD: +2.50
OD_VA2: 20/20
OD_VA1: 20/20
OS_SPHERE: +0.75
OU_VA: 20/20-2

## 2022-07-08 ASSESSMENT — VISUAL ACUITY
OD_BCVA: 20/30+2
OS_BCVA: 20/25+2

## 2022-07-27 ENCOUNTER — DOCTOR'S OFFICE (OUTPATIENT)
Dept: URBAN - NONMETROPOLITAN AREA CLINIC 1 | Facility: CLINIC | Age: 52
Setting detail: OPHTHALMOLOGY
End: 2022-07-27
Payer: COMMERCIAL

## 2022-07-27 DIAGNOSIS — Z96.1: ICD-10-CM

## 2022-07-27 PROCEDURE — 99024 POSTOP FOLLOW-UP VISIT: CPT | Performed by: OPHTHALMOLOGY

## 2022-07-27 ASSESSMENT — REFRACTION_CURRENTRX
OS_AXIS: 095
OS_VPRISM_DIRECTION: BF
OD_VPRISM_DIRECTION: BF
OS_ADD: +2.50
OD_ADD: +2.50
OS_CYLINDER: -1.00
OD_AXIS: 091
OS_SPHERE: +0.25
OD_OVR_VA: 20/
OD_CYLINDER: -0.75
OD_SPHERE: +0.50
OS_OVR_VA: 20/

## 2022-07-27 ASSESSMENT — REFRACTION_AUTOREFRACTION
OS_AXIS: 033
OD_CYLINDER: -0.50
OS_SPHERE: +1.25
OS_CYLINDER: -0.50
OD_SPHERE: -0.25
OD_AXIS: 070

## 2022-07-27 ASSESSMENT — REFRACTION_MANIFEST
OD_VA2: 20/20
OD_SPHERE: +1.00
OS_CYLINDER: -1.00
OD_ADD: +2.50
OS_ADD: +2.50
OS_SPHERE: +0.75
OS_VA2: 20/20
OS_AXIS: 095
OD_AXIS: 090
OD_VA1: 20/20
OU_VA: 20/20-2
OS_VA1: 20/20-2
OD_CYLINDER: -1.25

## 2022-07-27 ASSESSMENT — SPHEQUIV_DERIVED
OS_SPHEQUIV: 0.25
OS_SPHEQUIV: 1
OD_SPHEQUIV: 0.375
OD_SPHEQUIV: -0.5

## 2022-07-27 ASSESSMENT — DRY EYES - PHYSICIAN NOTES
OD_GENERALCOMMENTS: KSICCA
OS_GENERALCOMMENTS: KSICCA

## 2022-07-27 ASSESSMENT — VISUAL ACUITY
OS_BCVA: 20/20
OD_BCVA: 20/20-1

## 2022-07-27 ASSESSMENT — PACHYMETRY
OS_CT_UM: 574
OD_CT_CORRECTION: -2
OD_CT_UM: 576
OS_CT_CORRECTION: -2

## 2022-07-27 ASSESSMENT — TONOMETRY: OD_IOP_MMHG: 10

## 2022-09-09 ENCOUNTER — DOCTOR'S OFFICE (OUTPATIENT)
Dept: URBAN - NONMETROPOLITAN AREA CLINIC 1 | Facility: CLINIC | Age: 52
Setting detail: OPHTHALMOLOGY
End: 2022-09-09
Payer: COMMERCIAL

## 2022-09-09 DIAGNOSIS — H52.223: ICD-10-CM

## 2022-09-09 DIAGNOSIS — H52.4: ICD-10-CM

## 2022-09-09 PROBLEM — H10.13 ALLERGIC CONJUNCTIVITIS; BOTH EYES: Status: ACTIVE | Noted: 2017-10-26

## 2022-09-09 PROBLEM — H16.223 KERATOCONJUNCTIVITIS SICCA; BOTH EYES: Status: ACTIVE | Noted: 2021-01-06

## 2022-09-09 PROBLEM — G44.1 VASCULAR HEADACHE, NOT ELSEWHERE CLASSIFIED ; BOTH EYES: Status: ACTIVE | Noted: 2017-10-26

## 2022-09-09 PROBLEM — H40.033: Status: ACTIVE | Noted: 2022-01-12

## 2022-09-09 PROCEDURE — 92012 INTRM OPH EXAM EST PATIENT: CPT | Performed by: OPTOMETRIST

## 2022-09-09 PROCEDURE — 92015 DETERMINE REFRACTIVE STATE: CPT | Performed by: OPTOMETRIST

## 2022-09-09 ASSESSMENT — REFRACTION_CURRENTRX
OD_VPRISM_DIRECTION: BF
OS_OVR_VA: 20/
OS_CYLINDER: -1.00
OD_CYLINDER: -0.75
OD_SPHERE: +0.50
OS_AXIS: 095
OS_VPRISM_DIRECTION: BF
OD_OVR_VA: 20/
OD_AXIS: 091
OS_SPHERE: +0.25
OS_ADD: +2.50
OD_ADD: +2.50

## 2022-09-09 ASSESSMENT — REFRACTION_MANIFEST
OS_ADD: +2.25
OD_VA2: 20/20
OS_SPHERE: -0.25
OD_VA1: 20/20
OS_VA1: 20/20-2
OS_CYLINDER: -0.50
OS_VA2: 20/20-2
OD_SPHERE: +0.25
OD_ADD: +2.25
OD_AXIS: 090
OD_CYLINDER: -0.50
OS_AXIS: 075
OU_VA: 20/20-2

## 2022-09-09 ASSESSMENT — REFRACTION_AUTOREFRACTION
OD_SPHERE: +0.25
OD_CYLINDER: -0.50
OS_CYLINDER: -0.50
OS_AXIS: 073
OD_AXIS: 088
OS_SPHERE: -0.50

## 2022-09-09 ASSESSMENT — PACHYMETRY
OD_CT_UM: 576
OS_CT_CORRECTION: -2
OD_CT_CORRECTION: -2
OS_CT_UM: 574

## 2022-09-09 ASSESSMENT — TONOMETRY
OS_IOP_MMHG: 12
OD_IOP_MMHG: 13

## 2022-09-09 ASSESSMENT — SPHEQUIV_DERIVED
OS_SPHEQUIV: -0.5
OD_SPHEQUIV: 0
OS_SPHEQUIV: -0.75
OD_SPHEQUIV: 0

## 2022-09-09 ASSESSMENT — VISUAL ACUITY
OD_BCVA: 20/25-2
OS_BCVA: 20/25

## 2022-09-09 ASSESSMENT — DRY EYES - PHYSICIAN NOTES
OS_GENERALCOMMENTS: KSICCA
OD_GENERALCOMMENTS: KSICCA

## 2023-01-10 ENCOUNTER — DOCTOR'S OFFICE (OUTPATIENT)
Dept: URBAN - NONMETROPOLITAN AREA CLINIC 1 | Facility: CLINIC | Age: 53
Setting detail: OPHTHALMOLOGY
End: 2023-01-10
Payer: COMMERCIAL

## 2023-01-10 ENCOUNTER — OPTICAL OFFICE (OUTPATIENT)
Dept: URBAN - NONMETROPOLITAN AREA CLINIC 4 | Facility: CLINIC | Age: 53
Setting detail: OPHTHALMOLOGY
End: 2023-01-10
Payer: COMMERCIAL

## 2023-01-10 ENCOUNTER — OPTICAL OFFICE (OUTPATIENT)
Dept: URBAN - NONMETROPOLITAN AREA CLINIC 4 | Facility: CLINIC | Age: 53
Setting detail: OPHTHALMOLOGY
End: 2023-01-10

## 2023-01-10 DIAGNOSIS — H40.033: ICD-10-CM

## 2023-01-10 DIAGNOSIS — H43.811: ICD-10-CM

## 2023-01-10 DIAGNOSIS — H52.223: ICD-10-CM

## 2023-01-10 DIAGNOSIS — Z96.1: ICD-10-CM

## 2023-01-10 DIAGNOSIS — H35.373: ICD-10-CM

## 2023-01-10 DIAGNOSIS — H26.40: ICD-10-CM

## 2023-01-10 PROCEDURE — 76514 ECHO EXAM OF EYE THICKNESS: CPT | Performed by: OPHTHALMOLOGY

## 2023-01-10 PROCEDURE — V2202 LENS SPHERE BIFOCAL 7.12-20.: HCPCS | Performed by: OPTOMETRIST

## 2023-01-10 PROCEDURE — 92134 CPTRZ OPH DX IMG PST SGM RTA: CPT | Performed by: OPHTHALMOLOGY

## 2023-01-10 PROCEDURE — V2762 POLARIZATION, ANY LENS: HCPCS | Performed by: OPTOMETRIST

## 2023-01-10 PROCEDURE — 99214 OFFICE O/P EST MOD 30 MIN: CPT | Performed by: OPHTHALMOLOGY

## 2023-01-10 PROCEDURE — V2020 VISION SVCS FRAMES PURCHASES: HCPCS | Performed by: OPTOMETRIST

## 2023-01-10 PROCEDURE — 92083 EXTENDED VISUAL FIELD XM: CPT | Performed by: OPHTHALMOLOGY

## 2023-01-10 PROCEDURE — V2203 LENS SPHCYL BIFOCAL 4.00D/.1: HCPCS | Performed by: OPTOMETRIST

## 2023-01-10 PROCEDURE — V2784 LENS POLYCARB OR EQUAL: HCPCS | Performed by: OPTOMETRIST

## 2023-01-10 ASSESSMENT — CONFRONTATIONAL VISUAL FIELD TEST (CVF)
OD_FINDINGS: FULL
OS_FINDINGS: FULL

## 2023-01-10 ASSESSMENT — REFRACTION_CURRENTRX
OD_AXIS: 091
OS_ADD: +2.50
OS_VPRISM_DIRECTION: BF
OS_OVR_VA: 20/
OS_AXIS: 095
OD_VPRISM_DIRECTION: BF
OD_OVR_VA: 20/
OD_SPHERE: +0.50
OD_CYLINDER: -0.75
OS_SPHERE: +0.25
OD_OVR_VA: 20/
OD_ADD: +2.50
OS_CYLINDER: -1.00
OS_OVR_VA: 20/

## 2023-01-10 ASSESSMENT — VISUAL ACUITY
OD_BCVA: 20/25+1
OS_BCVA: 20/30+1

## 2023-01-10 ASSESSMENT — REFRACTION_AUTOREFRACTION
OS_CYLINDER: -0.50
OD_CYLINDER: -0.75
OS_AXIS: 080
OD_SPHERE: +0.25
OS_SPHERE: PLANO
OD_AXIS: 080

## 2023-01-10 ASSESSMENT — REFRACTION_MANIFEST
OD_SPHERE: +0.25
OS_VA1: 20/20-2
OS_ADD: +2.25
OD_VA2: 20/20
OS_VA2: 20/20-2
OD_CYLINDER: -0.50
OD_VA1: 20/20
OS_AXIS: 075
OS_SPHERE: -0.25
OD_ADD: +2.25
OD_AXIS: 090
OS_CYLINDER: -0.50
OU_VA: 20/20-2

## 2023-01-10 ASSESSMENT — PACHYMETRY
OS_CT_UM: 574
OD_CT_CORRECTION: -2
OD_CT_UM: 576
OS_CT_CORRECTION: -2

## 2023-01-10 ASSESSMENT — SPHEQUIV_DERIVED
OD_SPHEQUIV: -0.125
OD_SPHEQUIV: 0
OS_SPHEQUIV: -0.5

## 2023-01-10 ASSESSMENT — SUPERFICIAL PUNCTATE KERATITIS (SPK)
OD_SPK: ABSENT
OS_SPK: ABSENT

## 2023-01-10 ASSESSMENT — TONOMETRY
OS_IOP_MMHG: 14
OD_IOP_MMHG: 13

## 2023-01-11 ENCOUNTER — OPTICAL OFFICE (OUTPATIENT)
Dept: URBAN - NONMETROPOLITAN AREA CLINIC 4 | Facility: CLINIC | Age: 53
Setting detail: OPHTHALMOLOGY
End: 2023-01-11
Payer: COMMERCIAL

## 2023-01-11 DIAGNOSIS — H52.223: ICD-10-CM

## 2023-01-11 PROCEDURE — V2203 LENS SPHCYL BIFOCAL 4.00D/.1: HCPCS | Performed by: OPTOMETRIST

## 2023-01-11 PROCEDURE — V2784 LENS POLYCARB OR EQUAL: HCPCS | Performed by: OPTOMETRIST

## 2023-01-11 PROCEDURE — V2020 VISION SVCS FRAMES PURCHASES: HCPCS | Performed by: OPTOMETRIST

## 2023-01-11 PROCEDURE — V2202 LENS SPHERE BIFOCAL 7.12-20.: HCPCS | Performed by: OPTOMETRIST

## 2023-01-17 ENCOUNTER — AMBUL SURGICAL CARE (OUTPATIENT)
Dept: URBAN - NONMETROPOLITAN AREA SURGERY 1 | Facility: SURGERY | Age: 53
Setting detail: OPHTHALMOLOGY
End: 2023-01-17
Payer: COMMERCIAL

## 2023-01-17 DIAGNOSIS — H26.491: ICD-10-CM

## 2023-01-17 PROCEDURE — G8907 PT DOC NO EVENTS ON DISCHARG: HCPCS | Performed by: CLINIC/CENTER

## 2023-01-17 PROCEDURE — G8918 PT W/O PREOP ORDER IV AB PRO: HCPCS | Performed by: CLINIC/CENTER

## 2023-01-17 PROCEDURE — 66821 AFTER CATARACT LASER SURGERY: CPT | Performed by: CLINIC/CENTER

## 2023-01-17 PROCEDURE — G8918 PT W/O PREOP ORDER IV AB PRO: HCPCS | Performed by: OPHTHALMOLOGY

## 2023-01-17 PROCEDURE — G8907 PT DOC NO EVENTS ON DISCHARG: HCPCS | Performed by: OPHTHALMOLOGY

## 2023-01-17 PROCEDURE — 66821 AFTER CATARACT LASER SURGERY: CPT | Performed by: OPHTHALMOLOGY

## 2023-01-24 ENCOUNTER — AMBUL SURGICAL CARE (OUTPATIENT)
Dept: URBAN - NONMETROPOLITAN AREA SURGERY 1 | Facility: SURGERY | Age: 53
Setting detail: OPHTHALMOLOGY
End: 2023-01-24
Payer: COMMERCIAL

## 2023-01-24 DIAGNOSIS — H26.492: ICD-10-CM

## 2023-01-24 PROBLEM — H26.40 POSTERIOR CAPSULAR OPACIFICATION ; BOTH EYES: Status: ACTIVE | Noted: 2023-01-10

## 2023-01-24 PROBLEM — H35.373 ERM; BOTH EYES: Status: ACTIVE | Noted: 2023-01-10

## 2023-01-24 PROCEDURE — G8907 PT DOC NO EVENTS ON DISCHARG: HCPCS | Performed by: OPHTHALMOLOGY

## 2023-01-24 PROCEDURE — G8918 PT W/O PREOP ORDER IV AB PRO: HCPCS | Performed by: CLINIC/CENTER

## 2023-01-24 PROCEDURE — G8907 PT DOC NO EVENTS ON DISCHARG: HCPCS | Performed by: CLINIC/CENTER

## 2023-01-24 PROCEDURE — G8918 PT W/O PREOP ORDER IV AB PRO: HCPCS | Performed by: OPHTHALMOLOGY

## 2023-01-24 PROCEDURE — 66821 AFTER CATARACT LASER SURGERY: CPT | Performed by: OPHTHALMOLOGY

## 2023-01-24 PROCEDURE — 66821 AFTER CATARACT LASER SURGERY: CPT | Performed by: CLINIC/CENTER

## 2023-02-14 ENCOUNTER — DOCTOR'S OFFICE (OUTPATIENT)
Dept: URBAN - NONMETROPOLITAN AREA CLINIC 1 | Facility: CLINIC | Age: 53
Setting detail: OPHTHALMOLOGY
End: 2023-02-14
Payer: COMMERCIAL

## 2023-02-14 DIAGNOSIS — H40.033: ICD-10-CM

## 2023-02-14 DIAGNOSIS — H16.223: ICD-10-CM

## 2023-02-14 DIAGNOSIS — H43.811: ICD-10-CM

## 2023-02-14 DIAGNOSIS — H35.373: ICD-10-CM

## 2023-02-14 DIAGNOSIS — H26.40: ICD-10-CM

## 2023-02-14 DIAGNOSIS — Z96.1: ICD-10-CM

## 2023-02-14 PROCEDURE — 99213 OFFICE O/P EST LOW 20 MIN: CPT | Performed by: OPHTHALMOLOGY

## 2023-02-14 PROCEDURE — 99024 POSTOP FOLLOW-UP VISIT: CPT | Performed by: OPHTHALMOLOGY

## 2023-02-14 ASSESSMENT — REFRACTION_CURRENTRX
OS_VPRISM_DIRECTION: BF
OD_AXIS: 091
OS_CYLINDER: -1.00
OD_OVR_VA: 20/
OS_OVR_VA: 20/
OS_SPHERE: +0.25
OS_AXIS: 095
OD_ADD: +2.50
OS_OVR_VA: 20/
OD_SPHERE: +0.50
OD_CYLINDER: -0.75
OD_VPRISM_DIRECTION: BF
OS_ADD: +2.50
OD_OVR_VA: 20/

## 2023-02-14 ASSESSMENT — CONFRONTATIONAL VISUAL FIELD TEST (CVF)
OS_FINDINGS: FULL
OD_FINDINGS: FULL

## 2023-02-14 ASSESSMENT — PACHYMETRY
OD_CT_CORRECTION: -2
OS_CT_CORRECTION: -2
OD_CT_UM: 576
OS_CT_UM: 574

## 2023-02-14 ASSESSMENT — REFRACTION_MANIFEST
OD_ADD: +2.25
OD_CYLINDER: -0.50
OS_VA2: 20/20-2
OU_VA: 20/20-2
OS_SPHERE: -0.25
OS_CYLINDER: -0.50
OD_SPHERE: +0.25
OS_ADD: +2.25
OD_VA2: 20/20
OD_AXIS: 090
OD_VA1: 20/20
OS_AXIS: 075
OS_VA1: 20/20-2

## 2023-02-14 ASSESSMENT — SUPERFICIAL PUNCTATE KERATITIS (SPK)
OS_SPK: ABSENT
OD_SPK: ABSENT

## 2023-02-14 ASSESSMENT — SPHEQUIV_DERIVED
OS_SPHEQUIV: -0.5
OD_SPHEQUIV: -0.125
OD_SPHEQUIV: 0

## 2023-02-14 ASSESSMENT — REFRACTION_AUTOREFRACTION
OS_SPHERE: PLANO
OS_AXIS: 080
OD_CYLINDER: -0.75
OD_SPHERE: +0.25
OS_CYLINDER: -0.50
OD_AXIS: 080

## 2023-02-14 ASSESSMENT — VISUAL ACUITY
OS_BCVA: 20/30
OD_BCVA: 20/30-1

## 2023-02-14 ASSESSMENT — TONOMETRY
OS_IOP_MMHG: 13
OD_IOP_MMHG: 15

## 2023-07-18 ENCOUNTER — DOCTOR'S OFFICE (OUTPATIENT)
Dept: URBAN - NONMETROPOLITAN AREA CLINIC 1 | Facility: CLINIC | Age: 53
Setting detail: OPHTHALMOLOGY
End: 2023-07-18
Payer: COMMERCIAL

## 2023-07-18 DIAGNOSIS — H43.811: ICD-10-CM

## 2023-07-18 DIAGNOSIS — H26.40: ICD-10-CM

## 2023-07-18 DIAGNOSIS — H35.373: ICD-10-CM

## 2023-07-18 DIAGNOSIS — H16.223: ICD-10-CM

## 2023-07-18 DIAGNOSIS — H40.033: ICD-10-CM

## 2023-07-18 PROCEDURE — 92133 CPTRZD OPH DX IMG PST SGM ON: CPT | Performed by: OPHTHALMOLOGY

## 2023-07-18 PROCEDURE — 99214 OFFICE O/P EST MOD 30 MIN: CPT | Performed by: OPHTHALMOLOGY

## 2023-07-18 ASSESSMENT — REFRACTION_CURRENTRX
OS_SPHERE: +0.25
OS_CYLINDER: -1.00
OS_OVR_VA: 20/
OS_OVR_VA: 20/
OD_OVR_VA: 20/
OS_ADD: +2.50
OD_VPRISM_DIRECTION: BF
OD_ADD: +2.50
OS_AXIS: 095
OD_CYLINDER: -0.75
OD_AXIS: 091
OD_OVR_VA: 20/
OS_VPRISM_DIRECTION: BF
OD_SPHERE: +0.50

## 2023-07-18 ASSESSMENT — SPHEQUIV_DERIVED
OS_SPHEQUIV: -0.5
OD_SPHEQUIV: -0.125
OD_SPHEQUIV: 0

## 2023-07-18 ASSESSMENT — VISUAL ACUITY
OS_BCVA: 20/30+2
OD_BCVA: 20/25

## 2023-07-18 ASSESSMENT — PACHYMETRY
OS_CT_CORRECTION: -2
OD_CT_CORRECTION: -2
OD_CT_UM: 576
OS_CT_UM: 574

## 2023-07-18 ASSESSMENT — CONFRONTATIONAL VISUAL FIELD TEST (CVF)
OS_FINDINGS: FULL
OD_FINDINGS: FULL

## 2023-07-18 ASSESSMENT — REFRACTION_AUTOREFRACTION
OS_CYLINDER: -0.50
OS_AXIS: 080
OD_CYLINDER: -0.75
OD_SPHERE: +0.25
OD_AXIS: 080
OS_SPHERE: PLANO

## 2023-07-18 ASSESSMENT — REFRACTION_MANIFEST
OS_VA1: 20/20-2
OD_VA1: 20/20
OD_SPHERE: +0.25
OS_SPHERE: -0.25
OD_ADD: +2.25
OS_AXIS: 075
OS_CYLINDER: -0.50
OU_VA: 20/20-2
OS_ADD: +2.25
OS_VA2: 20/20-2
OD_CYLINDER: -0.50
OD_AXIS: 090
OD_VA2: 20/20

## 2023-07-18 ASSESSMENT — SUPERFICIAL PUNCTATE KERATITIS (SPK)
OS_SPK: ABSENT
OD_SPK: ABSENT

## 2023-07-18 ASSESSMENT — TONOMETRY
OS_IOP_MMHG: 13
OD_IOP_MMHG: 14

## 2023-07-28 ENCOUNTER — OFFICE VISIT (OUTPATIENT)
Dept: OBGYN CLINIC | Facility: CLINIC | Age: 53
End: 2023-07-28
Payer: COMMERCIAL

## 2023-07-28 ENCOUNTER — HOSPITAL ENCOUNTER (OUTPATIENT)
Dept: RADIOLOGY | Facility: CLINIC | Age: 53
End: 2023-07-28
Payer: COMMERCIAL

## 2023-07-28 VITALS
BODY MASS INDEX: 29.88 KG/M2 | TEMPERATURE: 97.8 F | DIASTOLIC BLOOD PRESSURE: 76 MMHG | HEART RATE: 92 BPM | SYSTOLIC BLOOD PRESSURE: 118 MMHG | HEIGHT: 62 IN | WEIGHT: 162.4 LBS

## 2023-07-28 DIAGNOSIS — M25.562 LEFT KNEE PAIN, UNSPECIFIED CHRONICITY: ICD-10-CM

## 2023-07-28 DIAGNOSIS — M25.561 RIGHT KNEE PAIN, UNSPECIFIED CHRONICITY: ICD-10-CM

## 2023-07-28 DIAGNOSIS — M47.26 OTHER SPONDYLOSIS WITH RADICULOPATHY, LUMBAR REGION: ICD-10-CM

## 2023-07-28 DIAGNOSIS — M25.561 CHRONIC PAIN OF RIGHT KNEE: ICD-10-CM

## 2023-07-28 DIAGNOSIS — M25.562 CHRONIC PAIN OF LEFT KNEE: Primary | ICD-10-CM

## 2023-07-28 DIAGNOSIS — M17.0 PRIMARY OSTEOARTHRITIS OF BOTH KNEES: ICD-10-CM

## 2023-07-28 DIAGNOSIS — G89.29 CHRONIC PAIN OF LEFT KNEE: Primary | ICD-10-CM

## 2023-07-28 DIAGNOSIS — G89.29 CHRONIC PAIN OF RIGHT KNEE: ICD-10-CM

## 2023-07-28 PROCEDURE — 73564 X-RAY EXAM KNEE 4 OR MORE: CPT

## 2023-07-28 PROCEDURE — 99204 OFFICE O/P NEW MOD 45 MIN: CPT | Performed by: ORTHOPAEDIC SURGERY

## 2023-07-28 RX ORDER — VALACYCLOVIR HYDROCHLORIDE 500 MG/1
TABLET, FILM COATED ORAL
COMMUNITY
Start: 2023-04-26

## 2023-07-28 RX ORDER — TRAMADOL HYDROCHLORIDE 50 MG/1
1 TABLET ORAL 2 TIMES DAILY PRN
COMMUNITY
Start: 2023-03-09 | End: 2023-09-05

## 2023-07-28 RX ORDER — GABAPENTIN 300 MG/1
CAPSULE ORAL
COMMUNITY
Start: 2023-05-21

## 2023-07-28 NOTE — PROGRESS NOTES
ASSESSMENT/PLAN:    Diagnoses and all orders for this visit:    Chronic pain of left knee  -     XR knee 4+ vw left injury; Future    Chronic pain of right knee  -     XR knee 4+ vw right injury; Future  -     Medial  Knee Brace    Primary osteoarthritis of both knees  -     Medial  Knee Brace    Other spondylosis with radiculopathy, lumbar region    Plan: Treatment was discussed. I did discuss the option of obtaining an MRI. However, she demonstrates no clinical evidence of meniscal or ligamentous pathology and an MRI would likely not alter the decision making process. She is not willing to live with the current symptoms. An  brace was also discussed and she would like to try the brace to see if this does provide her relief. I have recommended she contact me in follow-up 2 to 4 weeks after she obtains the brace to report her response. I have encouraged her to contact me if questions or concerns arise in the interim. If symptoms do not improve with the brace, an MRI might be more strongly indicated to see if there is some occult meniscal pathology, such as a posterior root tear. However, once again, the clinical examination is not consistent with mechanical pathology. _____________________________________________________  CHIEF COMPLAINT:  Chief Complaint   Patient presents with   • Right Knee - Pain         SUBJECTIVE:  Cherrie Payne is a 47y.o. year old female who presents for initial evaluation of bilateral knee pain, right more significant than left. Patient states that she was previously under the care of Dr. Kimberly Anderson of [facility] in regards to primary osteoarthritis of the right knee. Previous treatments have included CS injections to the right knee with waning results, and Euflexxa injection series performed between May 25 and June 8, 2023 without significant improvement.  She also notes relatively new onset pain in the left knee, for which she received a CS injection on Gloria 15, 2023 with complete relief of her symptoms. On today's presentation, she notes pain in the medial aspect of the right knee that is painful both at rest, and with activity. She reports grinding, clicking, and popping sensations with active knee flexion and extension and when rising from a seated position. He reports occasional swelling in the right knee. She denies any numbness, tingling, or feelings of instability. She currently takes tramadol as needed and gabapentin as needed for pain. She also occasionally takes OTC Tylenol for pain.       PAST MEDICAL HISTORY:  Past Medical History:   Diagnosis Date   • Anxiety    • Beta 0 thalassemia (HCC)    • COPD (chronic obstructive pulmonary disease) ([address])    • Cutaneous lupus erythematosus    • Depression    • Esophageal abnormality     Gets scopes every 4 months   • Gastroesophageal reflux disease, unspecified whether esophagitis present    • Osteoarthritis of both knees, unspecified osteoarthritis type    • Other intervertebral disc degeneration, lumbar region    • Other spondylosis, cervical region    • Recurrent major depressive disorder in remission ([address])        PAST SURGICAL HISTORY:  Past Surgical History:   Procedure Laterality Date   • ELBOW SURGERY     • ESOPHAGOSCOPY     • FOOT SURGERY     • HAND SURGERY     • HYSTERECTOMY     • TRIGGER FINGER RELEASE     • WISDOM TOOTH EXTRACTION         FAMILY HISTORY:  Family History   Problem Relation Age of Onset   • Hypertension Mother    • Dementia Mother    • Osteoarthritis Mother    • Alzheimer's disease Mother    • Esophageal cancer Father        SOCIAL HISTORY:  Social History     Tobacco Use   • Smoking status: Every Day     Packs/day: 1.00     Years: 30.00     Total pack years: 30.00     Types: Cigarettes   • Smokeless tobacco: Current   Vaping Use   • Vaping Use: Never used   Substance Use Topics   • Alcohol use: Yes     Comment: rare   • Drug use: Yes     Comment: CBD gummies MEDICATIONS:    Current Outpatient Medications:   •  Ascorbic Acid, Vitamin C, (VITAMIN C) 100 MG tablet, Take 500 mg by mouth daily, Disp: , Rfl:   •  Calcium Carb-Cholecalciferol 1000-800 MG-UNIT TABS, , Disp: , Rfl:   •  citalopram (CeleXA) 20 mg tablet, Take by mouth, Disp: , Rfl:   •  estradiol (ESTRACE) 0.1 mg/g vaginal cream, , Disp: , Rfl:   •  fluticasone (FLOVENT HFA) 220 mcg/act inhaler, Take by mouth, Disp: , Rfl:   •  gabapentin (NEURONTIN) 300 mg capsule, , Disp: , Rfl:   •  hydrOXYzine HCL (ATARAX) 25 mg tablet, Take by mouth, Disp: , Rfl:   •  ketoconazole (NIZORAL) 2 % cream, , Disp: , Rfl:   •  lysine 500 MG TABS, Take by mouth, Disp: , Rfl:   •  Multiple Vitamin (MULTIVITAMIN) capsule, Take by mouth, Disp: , Rfl:   •  NON FORMULARY, CBD gummies, Disp: , Rfl:   •  omeprazole (PriLOSEC) 40 MG capsule, , Disp: , Rfl:   •  Psyllium 100 % POWD, Take by mouth, Disp: , Rfl:   •  traMADol (ULTRAM) 50 mg tablet, 1 tablet 2 (two) times a day as needed, Disp: , Rfl:   •  triamcinolone (KENALOG) 0.1 % cream, , Disp: , Rfl:   •  valACYclovir (VALTREX) 500 mg tablet, Valacyclovir Oral     1 tab prn   active, Disp: , Rfl:   •  Coenzyme Q10 10 MG capsule, Take 100 mg by mouth daily, Disp: , Rfl:   •  conjugated estrogens (PREMARIN) vaginal cream, , Disp: , Rfl:   •  cyanocobalamin (VITAMIN B-12) 100 mcg tablet, Take by mouth, Disp: , Rfl:   •  diclofenac (VOLTAREN) 75 mg EC tablet, Take by mouth, Disp: , Rfl:   •  LORazepam (ATIVAN) 1 mg tablet, Take by mouth, Disp: , Rfl:   •  methylPREDNISolone 4 MG tablet therapy pack, follow package directions, Disp: , Rfl:   •  metoclopramide (REGLAN) 5 mg tablet, Take by mouth, Disp: , Rfl:   •  Pediatric Multivitamins-Fl (MULTIVITAMINS/FL PO), Take by mouth, Disp: , Rfl:   •  Zinc 50 MG TABS, Take 50 mg by mouth, Disp: , Rfl:     ALLERGIES:  Allergies   Allergen Reactions   • Ciprofloxacin    • Sulfa Antibiotics        Review of systems:   Constitutional: Negative for fatigue, fever or loss of apetite. HENT: Negative. Respiratory: Negative for shortness of breath, dyspnea. Cardiovascular: Negative for chest pain/tightness. Gastrointestinal: Negative for abdominal pain, N/V. Endocrine: Negative for cold/heat intolerance, unexplained weight loss/gain. Genitourinary: Negative for flank pain, dysuria, hematuria. Musculoskeletal: As noted in HPI   Skin: Negative for rash. Neurological: Negative for numbness, tingling, paresthesias  Psychiatric/Behavioral: Negative for agitation. _____________________________________________________  PHYSICAL EXAMINATION:    Blood pressure 118/76, pulse 92, temperature 97.8 °F (36.6 °C), temperature source Temporal, height 5' 2" (1.575 m), weight 73.7 kg (162 lb 6.4 oz).     General: well developed and well nourished, alert, oriented times 3 and appears comfortable  Psychiatric: Normal  HEENT: Benign  Cardiovascular: Normal rate    Pulmonary: No wheezing or stridor  Abdomen: Soft, Nontender  Skin: No masses, erythema, lacerations, fluctation, ulcerations  Neurovascular: Palpable distal pulses    MUSCULOSKELETAL EXAMINATION:  Right Knee -   Patient ambulates with steady gait pattern  Uses no assistive device  No anatomical deformity  Skin is warm and dry to touch with no signs of erythema, ecchymosis, infection  No soft tissue swelling, no effusion noted  ROM 0-130  TTP over medial femoral condyle   Nontender over medial or lateral joint lines, nontender over pes anserine bursa  Flexor and extensor mechanisms intact  Knee is stable to varus and valgus stress  - Lachman's  - Anterior Drawer, - Posterior Drawer  - medial Alexa's, - lateral Alexa's  Patella tracks centrally without palpable crepitus  Calf compartments are soft and supple  2+ TP and DP pulses with brisk capillary refill to the toes  Sural, saphenous, tibial, superficial and deep peroneal motor and sensory distributions intact  Sensation light touch intact distally    Left knee exam -   Normal alignment  No tenderness to palpation  ROM 0-130 without crepitation  Stable to varus and valgus stress  Stable anterior posterior stress  No pain provocation with special testing  Neurovascularly intact distally  _____________________________________________________  STUDIES REVIEWED:  X-rays obtained today demonstrate slight narrowing of the medial joint space and mild subchondral sclerosis without additional findings. Slightly greater involvement of the right knee than the left knee.       Scribe Attestation    I,:  Karen Johnson am acting as a scribe while in the presence of the attending physician.:       I,:  Caridad Perry personally performed the services described in this documentation    as scribed in my presence.:

## 2023-08-16 ENCOUNTER — OFFICE VISIT (OUTPATIENT)
Dept: OBGYN CLINIC | Facility: CLINIC | Age: 53
End: 2023-08-16
Payer: COMMERCIAL

## 2023-08-16 VITALS
SYSTOLIC BLOOD PRESSURE: 110 MMHG | HEART RATE: 78 BPM | TEMPERATURE: 97.4 F | BODY MASS INDEX: 30.44 KG/M2 | WEIGHT: 165.4 LBS | DIASTOLIC BLOOD PRESSURE: 77 MMHG | HEIGHT: 62 IN

## 2023-08-16 DIAGNOSIS — G89.29 CHRONIC PAIN OF RIGHT KNEE: Primary | ICD-10-CM

## 2023-08-16 DIAGNOSIS — M17.0 PRIMARY OSTEOARTHRITIS OF BOTH KNEES: ICD-10-CM

## 2023-08-16 DIAGNOSIS — M25.561 CHRONIC PAIN OF RIGHT KNEE: Primary | ICD-10-CM

## 2023-08-16 PROCEDURE — 99213 OFFICE O/P EST LOW 20 MIN: CPT | Performed by: ORTHOPAEDIC SURGERY

## 2023-08-16 NOTE — PROGRESS NOTES
ASSESSMENT/PLAN:    Diagnoses and all orders for this visit:    Chronic pain of right knee  -     MRI knee right wo contrast; Future    Primary osteoarthritis of both knees    Other orders  -     KETOCONAZOLE PO  -     LATANOPROST OP    Will order MRI of the right knee and see the patient back in the office with MRI results. She can continue with her medial  brace. She may continue using ice and over-the-counter pain medicines. Activity as tolerated. No follow-ups on file.  _____________________________________________________  CHIEF COMPLAINT:  Chief Complaint   Patient presents with   • Follow-up     SUBJECTIVE:  Jacqueline Buckner is a 47y.o. year old female who presents for follow up primarily right knee pain. She has bilateral knee pain but her right knee is much more symptomatic than her left. She was fitted with a medial  brace which provided complete pain relief for 1 week. She notes over the past week she is getting anterior knee pain again which she has always had. She does not have any of her back pain that goes from her hip down to her ankle at current. She gets some benefit with pool exercises and walking in the pool. Also uses ice with some pain relief. He has had cortisone and Visco injections by her pain management specialist in the past.  These are not providing any benefit.     PAST MEDICAL HISTORY:  Past Medical History:   Diagnosis Date   • Anxiety    • Beta 0 thalassemia ([facility])    • COPD (chronic obstructive pulmonary disease) ([facility])    • Cutaneous lupus erythematosus    • Depression    • Esophageal abnormality     Gets scopes every 4 months   • Gastroesophageal reflux disease, unspecified whether esophagitis present    • Osteoarthritis of both knees, unspecified osteoarthritis type    • Other intervertebral disc degeneration, lumbar region    • Other spondylosis, cervical region    • Recurrent major depressive disorder in remission ([address])      PAST SURGICAL HISTORY:  Past Surgical History:   Procedure Laterality Date   • ELBOW SURGERY     • ESOPHAGOSCOPY     • FOOT SURGERY     • HAND SURGERY     • HYSTERECTOMY     • TRIGGER FINGER RELEASE     • WISDOM TOOTH EXTRACTION       FAMILY HISTORY:  Family History   Problem Relation Age of Onset   • Hypertension Mother    • Dementia Mother    • Osteoarthritis Mother    • Alzheimer's disease Mother    • Esophageal cancer Father      SOCIAL HISTORY:  Social History     Tobacco Use   • Smoking status: Every Day     Packs/day: 1.00     Years: 30.00     Total pack years: 30.00     Types: Cigarettes   • Smokeless tobacco: Current   Vaping Use   • Vaping Use: Never used   Substance Use Topics   • Alcohol use: Yes     Comment: rare   • Drug use: Yes     Comment: CBD gummies     MEDICATIONS:    Current Outpatient Medications:   •  Ascorbic Acid, Vitamin C, (VITAMIN C) 100 MG tablet, Take 500 mg by mouth daily, Disp: , Rfl:   •  Calcium Carb-Cholecalciferol 1000-800 MG-UNIT TABS, , Disp: , Rfl:   •  citalopram (CeleXA) 20 mg tablet, Take by mouth, Disp: , Rfl:   •  Coenzyme Q10 10 MG capsule, Take 100 mg by mouth daily, Disp: , Rfl:   •  conjugated estrogens (PREMARIN) vaginal cream, , Disp: , Rfl:   •  cyanocobalamin (VITAMIN B-12) 100 mcg tablet, Take by mouth, Disp: , Rfl:   •  diclofenac (VOLTAREN) 75 mg EC tablet, Take by mouth, Disp: , Rfl:   •  estradiol (ESTRACE) 0.1 mg/g vaginal cream, , Disp: , Rfl:   •  fluticasone (FLOVENT HFA) 220 mcg/act inhaler, Take by mouth, Disp: , Rfl:   •  gabapentin (NEURONTIN) 300 mg capsule, , Disp: , Rfl:   •  hydrOXYzine HCL (ATARAX) 25 mg tablet, Take by mouth, Disp: , Rfl:   •  ketoconazole (NIZORAL) 2 % cream, , Disp: , Rfl:   •  KETOCONAZOLE PO, , Disp: , Rfl:   •  LATANOPROST OP, , Disp: , Rfl:   •  LORazepam (ATIVAN) 1 mg tablet, Take by mouth, Disp: , Rfl:   •  lysine 500 MG TABS, Take by mouth, Disp: , Rfl:   •  methylPREDNISolone 4 MG tablet therapy pack, follow package directions, Disp: , Rfl:   •  metoclopramide (REGLAN) 5 mg tablet, Take by mouth, Disp: , Rfl:   •  Multiple Vitamin (MULTIVITAMIN) capsule, Take by mouth, Disp: , Rfl:   •  NON FORMULARY, CBD gummies, Disp: , Rfl:   •  omeprazole (PriLOSEC) 40 MG capsule, , Disp: , Rfl:   •  Pediatric Multivitamins-Fl (MULTIVITAMINS/FL PO), Take by mouth, Disp: , Rfl:   •  Psyllium 100 % POWD, Take by mouth, Disp: , Rfl:   •  traMADol (ULTRAM) 50 mg tablet, 1 tablet 2 (two) times a day as needed, Disp: , Rfl:   •  triamcinolone (KENALOG) 0.1 % cream, , Disp: , Rfl:   •  valACYclovir (VALTREX) 500 mg tablet, Valacyclovir Oral     1 tab prn   active, Disp: , Rfl:   •  Zinc 50 MG TABS, Take 50 mg by mouth, Disp: , Rfl:     ALLERGIES:  Allergies   Allergen Reactions   • Ciprofloxacin    • Sulfa Antibiotics      REVIEW OF SYSTEMS:  Pertinent items are noted in HPI. A comprehensive review of systems was negative.  _____________________________________________________  PHYSICAL EXAMINATION:  General: well developed and well nourished, alert, oriented times 3 and appears comfortable  Psychiatric: Normal  HEENT:  Normocephalic, atraumatic  Cardiovascular:  Regular  Pulmonary: No wheezing or stridor  Skin: No masses, erthema, lacerations, fluctation, ulcerations  Neurovascular: Grossly intact  MUSCULOSKELETAL EXAMINATION:    Right knee without erythema warmth nor effusion. There is some medial joint line tenderness with equivocal Alexa's. Does have medial patella facet tenderness as a patellar grind test patellar hook test positive as well. There is no lateral patella facet tenderness. Pain in the patella with flexion past 90 degrees. Gross ligamentous laxity she is grossly neurovascular intact. Medial  brace is fitting appropriately.     _____________________________________________________  STUDIES REVIEWED:  No Studies to review    PROCEDURES PERFORMED:  None today    Ajith Elaine PA-C

## 2023-08-16 NOTE — PATIENT INSTRUCTIONS
Will order MRI of the right knee and see the patient back in the office with MRI results. She can continue with her medial  brace. She may continue using ice and over-the-counter pain medicines. Activity as tolerated.

## 2023-08-29 ENCOUNTER — HOSPITAL ENCOUNTER (OUTPATIENT)
Dept: MRI IMAGING | Facility: HOSPITAL | Age: 53
Discharge: HOME | End: 2023-08-29
Payer: COMMERCIAL

## 2023-08-29 DIAGNOSIS — M25.561 CHRONIC PAIN OF RIGHT KNEE: ICD-10-CM

## 2023-08-29 DIAGNOSIS — G89.29 CHRONIC PAIN OF RIGHT KNEE: ICD-10-CM

## 2023-08-29 PROCEDURE — 73721 MRI JNT OF LWR EXTRE W/O DYE: CPT

## 2023-08-29 PROCEDURE — G1004 CDSM NDSC: HCPCS

## 2023-09-11 ENCOUNTER — DOCTOR'S OFFICE (OUTPATIENT)
Dept: URBAN - NONMETROPOLITAN AREA CLINIC 1 | Facility: CLINIC | Age: 53
Setting detail: OPHTHALMOLOGY
End: 2023-09-11
Payer: COMMERCIAL

## 2023-09-11 DIAGNOSIS — H52.4: ICD-10-CM

## 2023-09-11 DIAGNOSIS — H52.223: ICD-10-CM

## 2023-09-11 PROCEDURE — 92012 INTRM OPH EXAM EST PATIENT: CPT | Performed by: OPTOMETRIST

## 2023-09-11 PROCEDURE — 92015 DETERMINE REFRACTIVE STATE: CPT | Performed by: OPTOMETRIST

## 2023-09-11 ASSESSMENT — REFRACTION_CURRENTRX
OD_OVR_VA: 20/
OD_SPHERE: -0.25
OS_ADD: +2.50
OD_AXIS: 11
OD_CYLINDER: +0.50
OS_OVR_VA: 20/
OD_VPRISM_DIRECTION: BF
OS_OVR_VA: 20/
OD_OVR_VA: 20/
OS_VPRISM_DIRECTION: BF
OS_SPHERE: -0.75
OS_CYLINDER: +0.50
OS_AXIS: 171
OD_ADD: +2.50

## 2023-09-11 ASSESSMENT — REFRACTION_AUTOREFRACTION
OD_SPHERE: +0.50
OD_AXIS: 55
OS_SPHERE: 0.00
OD_CYLINDER: -0.50
OS_CYLINDER: 0.00

## 2023-09-11 ASSESSMENT — REFRACTION_MANIFEST
OS_CYLINDER: -0.25
OD_CYLINDER: -0.50
OS_VA1: 20/20-2
OS_VA2: 20/20-2
OD_SPHERE: +0.25
OS_ADD: +2.25
OD_ADD: +2.25
OS_SPHERE: PLANO
OD_VA1: 20/20
OS_AXIS: 075
OD_AXIS: 090
OD_VA2: 20/20
OU_VA: 20/20-2

## 2023-09-11 ASSESSMENT — SPHEQUIV_DERIVED
OD_SPHEQUIV: 0.25
OD_SPHEQUIV: 0
OS_SPHEQUIV: 0

## 2023-09-11 ASSESSMENT — VISUAL ACUITY
OS_BCVA: 20/25+1
OD_BCVA: 20/25-1

## 2023-09-11 ASSESSMENT — CONFRONTATIONAL VISUAL FIELD TEST (CVF)
OS_FINDINGS: FULL
OD_FINDINGS: FULL

## 2023-09-11 ASSESSMENT — SUPERFICIAL PUNCTATE KERATITIS (SPK)
OS_SPK: ABSENT
OD_SPK: ABSENT

## 2023-09-14 ENCOUNTER — OFFICE VISIT (OUTPATIENT)
Dept: OBGYN CLINIC | Facility: CLINIC | Age: 53
End: 2023-09-14
Payer: COMMERCIAL

## 2023-09-14 VITALS
DIASTOLIC BLOOD PRESSURE: 80 MMHG | WEIGHT: 165 LBS | HEIGHT: 62 IN | BODY MASS INDEX: 30.36 KG/M2 | SYSTOLIC BLOOD PRESSURE: 120 MMHG | HEART RATE: 80 BPM | TEMPERATURE: 98 F

## 2023-09-14 DIAGNOSIS — M47.26 OTHER SPONDYLOSIS WITH RADICULOPATHY, LUMBAR REGION: ICD-10-CM

## 2023-09-14 DIAGNOSIS — M17.0 PRIMARY OSTEOARTHRITIS OF BOTH KNEES: Primary | ICD-10-CM

## 2023-09-14 PROCEDURE — 99213 OFFICE O/P EST LOW 20 MIN: CPT | Performed by: ORTHOPAEDIC SURGERY

## 2023-09-14 NOTE — PROGRESS NOTES
ASSESSMENT/PLAN:    Diagnoses and all orders for this visit:    Primary osteoarthritis of both knees    Other spondylosis with radiculopathy, lumbar region        Plan: Treatment was discussed. At this time, she has failed to respond to injection therapies, does not see any improvement with a  brace and MRI demonstrates no evidence of intra-articular meniscal or ligamentous pathology. The only remaining option would really be knee replacement arthroplasty. She is not ready to commit to a knee replacement but will go home and think about it. She is to contact me if she wishes to proceed with knee replacement arthroplasty and she can be seen and we will proceed with scheduling. Return if symptoms worsen or fail to improve.      _____________________________________________________  CHIEF COMPLAINT:  Chief Complaint   Patient presents with   • Right Knee - Follow-up   • Follow-up         SUBJECTIVE:  Vickie Pandey is a 47y.o. year old female who presents for follow up of her right knee. She continues to complain of primarily anterior and medial pain. She notes pain with activity and at rest, indicating that oftentimes the pain is more problematic for her while trying to rest and relax. She denies any improvement with the  brace.       PAST MEDICAL HISTORY:  Past Medical History:   Diagnosis Date   • Anxiety    • Beta 0 thalassemia ([facility])    • COPD (chronic obstructive pulmonary disease) ([facility])    • Cutaneous lupus erythematosus    • Depression    • Esophageal abnormality     Gets scopes every 4 months   • Gastroesophageal reflux disease, unspecified whether esophagitis present    • Osteoarthritis of both knees, unspecified osteoarthritis type    • Other intervertebral disc degeneration, lumbar region    • Other spondylosis, cervical region    • Recurrent major depressive disorder in remission ([address])        PAST SURGICAL HISTORY:  Past Surgical History:   Procedure Laterality Date   • ELBOW SURGERY     • ESOPHAGOSCOPY     • FOOT SURGERY     • HAND SURGERY     • HYSTERECTOMY     • TRIGGER FINGER RELEASE     • WISDOM TOOTH EXTRACTION         FAMILY HISTORY:  Family History   Problem Relation Age of Onset   • Hypertension Mother    • Dementia Mother    • Osteoarthritis Mother    • Alzheimer's disease Mother    • Esophageal cancer Father        SOCIAL HISTORY:  Social History     Tobacco Use   • Smoking status: Every Day     Packs/day: 1.00     Years: 30.00     Total pack years: 30.00     Types: Cigarettes   • Smokeless tobacco: Current   Vaping Use   • Vaping Use: Never used   Substance Use Topics   • Alcohol use: Yes     Comment: rare   • Drug use: Yes     Comment: CBD gummies       MEDICATIONS:    Current Outpatient Medications:   •  Ascorbic Acid, Vitamin C, (VITAMIN C) 100 MG tablet, Take 500 mg by mouth daily, Disp: , Rfl:   •  Calcium Carb-Cholecalciferol 1000-800 MG-UNIT TABS, , Disp: , Rfl:   •  citalopram (CeleXA) 20 mg tablet, Take by mouth, Disp: , Rfl:   •  fluticasone (FLOVENT HFA) 220 mcg/act inhaler, Take by mouth, Disp: , Rfl:   •  gabapentin (NEURONTIN) 300 mg capsule, , Disp: , Rfl:   •  hydrOXYzine HCL (ATARAX) 25 mg tablet, Take by mouth, Disp: , Rfl:   •  ketoconazole (NIZORAL) 2 % cream, , Disp: , Rfl:   •  KETOCONAZOLE PO, , Disp: , Rfl:   •  LATANOPROST OP, , Disp: , Rfl:   •  lysine 500 MG TABS, Take by mouth, Disp: , Rfl:   •  Multiple Vitamin (MULTIVITAMIN) capsule, Take by mouth, Disp: , Rfl:   •  NON FORMULARY, CBD gummies, Disp: , Rfl:   •  omeprazole (PriLOSEC) 40 MG capsule, , Disp: , Rfl:   •  Psyllium 100 % POWD, Take by mouth, Disp: , Rfl:   •  triamcinolone (KENALOG) 0.1 % cream, , Disp: , Rfl:   •  valACYclovir (VALTREX) 500 mg tablet, Valacyclovir Oral     1 tab prn   active, Disp: , Rfl:   •  Coenzyme Q10 10 MG capsule, Take 100 mg by mouth daily (Patient not taking: Reported on 9/14/2023), Disp: , Rfl:   •  conjugated estrogens (PREMARIN) vaginal cream, , Disp: , Rfl:   •  cyanocobalamin (VITAMIN B-12) 100 mcg tablet, Take by mouth, Disp: , Rfl:   •  diclofenac (VOLTAREN) 75 mg EC tablet, Take by mouth (Patient not taking: Reported on 9/14/2023), Disp: , Rfl:   •  estradiol (ESTRACE) 0.1 mg/g vaginal cream, , Disp: , Rfl:   •  LORazepam (ATIVAN) 1 mg tablet, Take by mouth, Disp: , Rfl:   •  methylPREDNISolone 4 MG tablet therapy pack, follow package directions, Disp: , Rfl:   •  metoclopramide (REGLAN) 5 mg tablet, Take by mouth, Disp: , Rfl:   •  Pediatric Multivitamins-Fl (MULTIVITAMINS/FL PO), Take by mouth, Disp: , Rfl:   •  Zinc 50 MG TABS, Take 50 mg by mouth, Disp: , Rfl:     ALLERGIES:  Allergies   Allergen Reactions   • Ciprofloxacin    • Sulfa Antibiotics        REVIEW OF SYSTEMS:  Pertinent items are noted in HPI. A comprehensive review of systems was negative.      _____________________________________________________  PHYSICAL EXAMINATION:  General: well developed and well nourished, alert and appears comfortable  Psychiatric: Normal  HEENT:  Normocephalic, atraumatic  Cardiovascular:  Regular  Pulmonary: No wheezing or stridor  Skin: No masses, erthema, lacerations, fluctation, ulcerations  Neurovascular: Motor and sensory exams are intact and pulses palpable. MUSCULOSKELETAL EXAMINATION:    Right knee exam demonstrated the brace in place upon arrival.  This was removed without difficulty. She has slight laxity with valgus stress but denies any complaints. She has tenderness medially. She denies any tenderness over the anterior knee. The lateral knee is nontender. She does complain of pain with patellofemoral compression. There is no significant effusion noted. _____________________________________________________  STUDIES REVIEWED:  The MRI of her knee demonstrates evidence of patellofemoral osteoarthritis as well as lesser changes medially. There is patellar tendon tendinitis/tendinosis. The MRI report was reviewed.         Brent Villeda

## 2023-11-10 ENCOUNTER — DOCTOR'S OFFICE (OUTPATIENT)
Dept: URBAN - NONMETROPOLITAN AREA CLINIC 1 | Facility: CLINIC | Age: 53
Setting detail: OPHTHALMOLOGY
End: 2023-11-10
Payer: COMMERCIAL

## 2023-11-10 DIAGNOSIS — H35.373: ICD-10-CM

## 2023-11-10 DIAGNOSIS — H40.033: ICD-10-CM

## 2023-11-10 DIAGNOSIS — H43.811: ICD-10-CM

## 2023-11-10 DIAGNOSIS — H16.223: ICD-10-CM

## 2023-11-10 PROCEDURE — 92014 COMPRE OPH EXAM EST PT 1/>: CPT | Performed by: OPHTHALMOLOGY

## 2023-11-10 PROCEDURE — 92083 EXTENDED VISUAL FIELD XM: CPT | Performed by: OPHTHALMOLOGY

## 2023-11-10 PROCEDURE — 92134 CPTRZ OPH DX IMG PST SGM RTA: CPT | Performed by: OPHTHALMOLOGY

## 2023-11-10 ASSESSMENT — PACHYMETRY
OD_CT_UM: 576
OS_CT_CORRECTION: -2
OS_CT_UM: 574
OD_CT_CORRECTION: -2

## 2023-11-10 ASSESSMENT — SUPERFICIAL PUNCTATE KERATITIS (SPK)
OS_SPK: ABSENT
OD_SPK: ABSENT

## 2023-11-10 ASSESSMENT — CONFRONTATIONAL VISUAL FIELD TEST (CVF)
OS_FINDINGS: FULL
OD_FINDINGS: FULL

## 2023-11-10 ASSESSMENT — TONOMETRY
OD_IOP_MMHG: 16
OS_IOP_MMHG: 15

## 2023-11-15 ASSESSMENT — REFRACTION_CURRENTRX
OS_OVR_VA: 20/
OD_CYLINDER: +0.50
OD_OVR_VA: 20/
OS_SPHERE: -0.75
OS_AXIS: 171
OD_OVR_VA: 20/
OS_CYLINDER: +0.50
OD_ADD: +2.50
OS_ADD: +2.50
OS_OVR_VA: 20/
OD_VPRISM_DIRECTION: BF
OS_VPRISM_DIRECTION: BF
OD_AXIS: 11
OD_SPHERE: -0.25

## 2023-11-15 ASSESSMENT — REFRACTION_AUTOREFRACTION
OD_AXIS: 50
OD_SPHERE: +0.25
OS_SPHERE: +0.50
OS_AXIS: 78
OS_CYLINDER: -0.75
OD_CYLINDER: -0.50

## 2023-11-15 ASSESSMENT — REFRACTION_MANIFEST
OS_ADD: +2.25
OS_AXIS: 075
OU_VA: 20/20-2
OD_CYLINDER: -0.50
OD_VA1: 20/20
OS_CYLINDER: -0.25
OD_AXIS: 090
OD_ADD: +2.25
OS_VA2: 20/20-2
OS_SPHERE: PLANO
OD_VA2: 20/20
OD_SPHERE: +0.25
OS_VA1: 20/20-2

## 2023-11-15 ASSESSMENT — VISUAL ACUITY
OS_BCVA: 20/30
OD_BCVA: 20/30-2

## 2023-11-15 ASSESSMENT — SPHEQUIV_DERIVED
OD_SPHEQUIV: 0
OD_SPHEQUIV: 0
OS_SPHEQUIV: 0.125

## 2024-05-14 ENCOUNTER — DOCTOR'S OFFICE (OUTPATIENT)
Dept: URBAN - NONMETROPOLITAN AREA CLINIC 1 | Facility: CLINIC | Age: 54
Setting detail: OPHTHALMOLOGY
End: 2024-05-14
Payer: COMMERCIAL

## 2024-05-14 DIAGNOSIS — H43.811: ICD-10-CM

## 2024-05-14 DIAGNOSIS — H16.223: ICD-10-CM

## 2024-05-14 DIAGNOSIS — H35.373: ICD-10-CM

## 2024-05-14 PROBLEM — H16.222 KERATOCONJUNCTIVITIS SICCA; RIGHT EYE, LEFT EYE: Status: ACTIVE | Noted: 2024-05-14

## 2024-05-14 PROBLEM — H16.221 KERATOCONJUNCTIVITIS SICCA; RIGHT EYE, LEFT EYE: Status: ACTIVE | Noted: 2024-05-14

## 2024-05-14 PROCEDURE — 92134 CPTRZ OPH DX IMG PST SGM RTA: CPT | Performed by: OPHTHALMOLOGY

## 2024-05-14 PROCEDURE — 83861 MICROFLUID ANALY TEARS: CPT | Mod: QW,RT | Performed by: OPHTHALMOLOGY

## 2024-05-14 PROCEDURE — 68761 CLOSE TEAR DUCT OPENING: CPT | Mod: 50 | Performed by: OPHTHALMOLOGY

## 2024-05-14 PROCEDURE — 83861 MICROFLUID ANALY TEARS: CPT | Mod: QW,LT | Performed by: OPHTHALMOLOGY

## 2024-05-14 PROCEDURE — 92014 COMPRE OPH EXAM EST PT 1/>: CPT | Mod: 25 | Performed by: OPHTHALMOLOGY

## 2024-05-14 ASSESSMENT — CONFRONTATIONAL VISUAL FIELD TEST (CVF)
OD_FINDINGS: FULL
OS_FINDINGS: FULL

## 2024-06-11 ENCOUNTER — DOCTOR'S OFFICE (OUTPATIENT)
Dept: URBAN - NONMETROPOLITAN AREA CLINIC 1 | Facility: CLINIC | Age: 54
Setting detail: OPHTHALMOLOGY
End: 2024-06-11
Payer: COMMERCIAL

## 2024-06-11 ENCOUNTER — RX ONLY (RX ONLY)
Age: 54
End: 2024-06-11

## 2024-06-11 DIAGNOSIS — H16.223: ICD-10-CM

## 2024-06-11 PROCEDURE — 83861 MICROFLUID ANALY TEARS: CPT | Mod: QW,RT | Performed by: OPHTHALMOLOGY

## 2024-06-11 PROCEDURE — 83861 MICROFLUID ANALY TEARS: CPT | Mod: QW,LT | Performed by: OPHTHALMOLOGY

## 2024-06-11 PROCEDURE — 68761 CLOSE TEAR DUCT OPENING: CPT | Mod: 50 | Performed by: OPHTHALMOLOGY

## 2024-08-15 ENCOUNTER — DOCTOR'S OFFICE (OUTPATIENT)
Dept: URBAN - NONMETROPOLITAN AREA CLINIC 1 | Facility: CLINIC | Age: 54
Setting detail: OPHTHALMOLOGY
End: 2024-08-15
Payer: COMMERCIAL

## 2024-08-15 DIAGNOSIS — H43.393: ICD-10-CM

## 2024-08-15 DIAGNOSIS — G44.1: ICD-10-CM

## 2024-08-15 DIAGNOSIS — H43.811: ICD-10-CM

## 2024-08-15 DIAGNOSIS — H35.373: ICD-10-CM

## 2024-08-15 PROCEDURE — 99214 OFFICE O/P EST MOD 30 MIN: CPT | Performed by: OPHTHALMOLOGY

## 2024-08-15 PROCEDURE — 92202 OPSCPY EXTND ON/MAC DRAW: CPT | Performed by: OPHTHALMOLOGY

## 2024-08-15 PROCEDURE — 92134 CPTRZ OPH DX IMG PST SGM RTA: CPT | Performed by: OPHTHALMOLOGY

## 2024-08-15 ASSESSMENT — CONFRONTATIONAL VISUAL FIELD TEST (CVF)
OS_FINDINGS: FULL
OD_FINDINGS: FULL

## 2024-09-11 ENCOUNTER — DOCTOR'S OFFICE (OUTPATIENT)
Dept: URBAN - NONMETROPOLITAN AREA CLINIC 1 | Facility: CLINIC | Age: 54
Setting detail: OPHTHALMOLOGY
End: 2024-09-11
Payer: COMMERCIAL

## 2024-09-11 DIAGNOSIS — H16.223: ICD-10-CM

## 2024-09-11 DIAGNOSIS — H16.222: ICD-10-CM

## 2024-09-11 DIAGNOSIS — H16.221: ICD-10-CM

## 2024-09-11 DIAGNOSIS — H40.033: ICD-10-CM

## 2024-09-11 PROCEDURE — 83861 MICROFLUID ANALY TEARS: CPT | Mod: LT | Performed by: OPHTHALMOLOGY

## 2024-09-11 PROCEDURE — 83861 MICROFLUID ANALY TEARS: CPT | Mod: RT | Performed by: OPHTHALMOLOGY

## 2024-09-11 PROCEDURE — 92133 CPTRZD OPH DX IMG PST SGM ON: CPT | Performed by: OPHTHALMOLOGY

## 2024-09-11 PROCEDURE — 68761 CLOSE TEAR DUCT OPENING: CPT | Mod: 50 | Performed by: OPHTHALMOLOGY

## 2024-09-11 PROCEDURE — 68761 CLOSE TEAR DUCT OPENING: CPT | Mod: E1 | Performed by: OPHTHALMOLOGY

## 2024-09-11 ASSESSMENT — REFRACTION_MANIFEST
OD_CYLINDER: -0.50
OS_ADD: +2.25
OD_VA2: 20/20
OD_ADD: +2.25
OS_SPHERE: PLANO
OS_VA2: 20/20-2
OD_AXIS: 090
OU_VA: 20/20-2
OS_CYLINDER: -0.25
OD_VA1: 20/20
OS_AXIS: 075
OD_SPHERE: +0.25
OS_VA1: 20/20-2

## 2024-09-11 ASSESSMENT — REFRACTION_CURRENTRX
OD_CYLINDER: +0.50
OS_OVR_VA: 20/
OD_AXIS: 11
OD_OVR_VA: 20/
OD_ADD: +2.50
OD_SPHERE: -0.25
OS_SPHERE: -0.75
OD_OVR_VA: 20/
OS_VPRISM_DIRECTION: BF
OD_VPRISM_DIRECTION: BF
OS_OVR_VA: 20/
OS_ADD: +2.50
OS_CYLINDER: +0.50
OS_AXIS: 171

## 2024-09-11 ASSESSMENT — PUNCTA - ASSESSMENT
OS_PUNCTA: SIL PLUG LUL
OD_PUNCTA: SIL PLUG RUL

## 2024-09-11 ASSESSMENT — PACHYMETRY
OS_CT_UM: 574
OD_CT_UM: 576
OD_CT_CORRECTION: -2
OS_CT_CORRECTION: -2

## 2024-09-11 ASSESSMENT — REFRACTION_AUTOREFRACTION
OD_AXIS: 61
OS_CYLINDER: -0.75
OS_AXIS: 81
OD_CYLINDER: -0.75
OS_SPHERE: 0.00
OD_SPHERE: +0.50

## 2024-09-11 ASSESSMENT — CONFRONTATIONAL VISUAL FIELD TEST (CVF)
OD_FINDINGS: FULL
OS_FINDINGS: FULL

## 2024-09-11 ASSESSMENT — DRY EYES - PHYSICIAN NOTES
OS_GENERALCOMMENTS: MILD
OD_GENERALCOMMENTS: MILD

## 2024-09-11 ASSESSMENT — TONOMETRY
OS_IOP_MMHG: 15
OD_IOP_MMHG: 12

## 2024-09-11 ASSESSMENT — SUPERFICIAL PUNCTATE KERATITIS (SPK)
OD_SPK: ABSENT
OS_SPK: ABSENT

## 2024-09-11 ASSESSMENT — VISUAL ACUITY
OD_BCVA: 20/30+1
OS_BCVA: 20/30+1

## 2024-10-09 ENCOUNTER — DOCTOR'S OFFICE (OUTPATIENT)
Dept: URBAN - NONMETROPOLITAN AREA CLINIC 1 | Facility: CLINIC | Age: 54
Setting detail: OPHTHALMOLOGY
End: 2024-10-09
Payer: COMMERCIAL

## 2024-10-09 DIAGNOSIS — H16.223: ICD-10-CM

## 2024-10-09 DIAGNOSIS — G44.1: ICD-10-CM

## 2024-10-09 DIAGNOSIS — H16.221: ICD-10-CM

## 2024-10-09 DIAGNOSIS — H40.033: ICD-10-CM

## 2024-10-09 DIAGNOSIS — H16.222: ICD-10-CM

## 2024-10-09 PROCEDURE — 83861 MICROFLUID ANALY TEARS: CPT | Mod: RT | Performed by: OPHTHALMOLOGY

## 2024-10-09 PROCEDURE — 83861 MICROFLUID ANALY TEARS: CPT | Mod: LT | Performed by: OPHTHALMOLOGY

## 2024-10-09 PROCEDURE — 99213 OFFICE O/P EST LOW 20 MIN: CPT | Performed by: OPHTHALMOLOGY

## 2024-10-09 ASSESSMENT — REFRACTION_CURRENTRX
OS_ADD: +2.50
OS_OVR_VA: 20/
OD_VPRISM_DIRECTION: BF
OS_CYLINDER: +0.50
OD_OVR_VA: 20/
OS_SPHERE: -0.75
OD_CYLINDER: +0.50
OS_AXIS: 171
OD_OVR_VA: 20/
OD_SPHERE: -0.25
OS_OVR_VA: 20/
OD_AXIS: 11
OD_ADD: +2.50
OS_VPRISM_DIRECTION: BF

## 2024-10-09 ASSESSMENT — REFRACTION_AUTOREFRACTION
OS_CYLINDER: -0.50
OD_CYLINDER: -0.25
OD_SPHERE: +0.75
OD_AXIS: 60
OS_AXIS: 89
OS_SPHERE: +0.75

## 2024-10-09 ASSESSMENT — PUNCTA - ASSESSMENT
OD_PUNCTA: SIL PLUG RUL
OS_PUNCTA: SIL PLUG LUL

## 2024-10-09 ASSESSMENT — REFRACTION_MANIFEST
OU_VA: 20/20-2
OS_VA2: 20/20-2
OD_AXIS: 090
OD_SPHERE: +0.25
OD_CYLINDER: -0.50
OS_ADD: +2.25
OD_VA2: 20/20
OD_ADD: +2.25
OS_CYLINDER: -0.25
OS_SPHERE: PLANO
OS_VA1: 20/20-2
OD_VA1: 20/20
OS_AXIS: 075

## 2024-10-09 ASSESSMENT — PACHYMETRY
OD_CT_UM: 576
OS_CT_CORRECTION: -2
OD_CT_CORRECTION: -2
OS_CT_UM: 574

## 2024-10-09 ASSESSMENT — VISUAL ACUITY
OD_BCVA: 20/40+1
OS_BCVA: 20/25-2

## 2024-10-09 ASSESSMENT — CONFRONTATIONAL VISUAL FIELD TEST (CVF)
OD_FINDINGS: FULL
OS_FINDINGS: FULL

## 2024-10-09 ASSESSMENT — SUPERFICIAL PUNCTATE KERATITIS (SPK)
OS_SPK: ABSENT
OD_SPK: ABSENT

## 2024-10-09 ASSESSMENT — TONOMETRY
OS_IOP_MMHG: 12
OD_IOP_MMHG: 11

## 2024-10-11 ENCOUNTER — DOCTOR'S OFFICE (OUTPATIENT)
Dept: URBAN - NONMETROPOLITAN AREA CLINIC 1 | Facility: CLINIC | Age: 54
Setting detail: OPHTHALMOLOGY
End: 2024-10-11
Payer: COMMERCIAL

## 2024-10-11 DIAGNOSIS — H52.4: ICD-10-CM

## 2024-10-11 DIAGNOSIS — H52.223: ICD-10-CM

## 2024-10-11 PROCEDURE — 92015 DETERMINE REFRACTIVE STATE: CPT | Performed by: OPTOMETRIST

## 2024-10-11 PROCEDURE — 92012 INTRM OPH EXAM EST PATIENT: CPT | Performed by: OPTOMETRIST

## 2024-10-11 ASSESSMENT — REFRACTION_CURRENTRX
OD_SPHERE: +0.25
OD_OVR_VA: 20/
OD_ADD: +2.50
OS_CYLINDER: -0.50
OS_OVR_VA: 20/
OD_OVR_VA: 20/
OD_CYLINDER: -0.50
OD_AXIS: 101
OS_AXIS: 073
OS_ADD: +2.50
OS_OVR_VA: 20/
OD_VPRISM_DIRECTION: BF
OS_SPHERE: -0.25
OS_VPRISM_DIRECTION: BF

## 2024-10-11 ASSESSMENT — REFRACTION_AUTOREFRACTION
OS_AXIS: 074
OD_CYLINDER: -0.75
OD_SPHERE: +0.75
OS_SPHERE: +0.25
OS_CYLINDER: -0.50
OD_AXIS: 057

## 2024-10-11 ASSESSMENT — REFRACTION_MANIFEST
OS_VA2: 20/30-2
OD_AXIS: 090
OS_CYLINDER: -0.50
OD_ADD: +2.50
OS_AXIS: 075
OS_VA1: 20/30-2
OD_VA1: 20/30+2
OD_SPHERE: +0.75
OD_CYLINDER: -0.75
OS_SPHERE: +0.50
OD_VA2: 20/30+2
OU_VA: 20/30+2
OS_ADD: +2.50

## 2024-10-11 ASSESSMENT — SUPERFICIAL PUNCTATE KERATITIS (SPK)
OD_SPK: ABSENT
OS_SPK: ABSENT

## 2024-10-11 ASSESSMENT — CONFRONTATIONAL VISUAL FIELD TEST (CVF)
OS_FINDINGS: FULL
OD_FINDINGS: FULL

## 2024-10-11 ASSESSMENT — PUNCTA - ASSESSMENT
OS_PUNCTA: SIL PLUG LUL
OD_PUNCTA: SIL PLUG RUL

## 2024-10-11 ASSESSMENT — VISUAL ACUITY
OS_BCVA: 20/40+2
OD_BCVA: 20/40+2

## 2025-02-10 ENCOUNTER — DOCTOR'S OFFICE (OUTPATIENT)
Dept: URBAN - NONMETROPOLITAN AREA CLINIC 1 | Facility: CLINIC | Age: 55
Setting detail: OPHTHALMOLOGY
End: 2025-02-10
Payer: COMMERCIAL

## 2025-02-10 DIAGNOSIS — H53.121: ICD-10-CM

## 2025-02-10 DIAGNOSIS — H35.373: ICD-10-CM

## 2025-02-10 DIAGNOSIS — H16.223: ICD-10-CM

## 2025-02-10 DIAGNOSIS — H43.393: ICD-10-CM

## 2025-02-10 PROCEDURE — 99213 OFFICE O/P EST LOW 20 MIN: CPT | Performed by: OPHTHALMOLOGY

## 2025-02-10 PROCEDURE — 92134 CPTRZ OPH DX IMG PST SGM RTA: CPT | Performed by: OPHTHALMOLOGY

## 2025-02-10 ASSESSMENT — REFRACTION_AUTOREFRACTION
OS_AXIS: 074
OD_SPHERE: +0.75
OS_CYLINDER: -0.50
OS_SPHERE: +0.25
OD_AXIS: 057
OD_CYLINDER: -0.75

## 2025-02-10 ASSESSMENT — REFRACTION_CURRENTRX
OS_AXIS: 073
OD_OVR_VA: 20/
OS_VPRISM_DIRECTION: BF
OD_OVR_VA: 20/
OD_CYLINDER: -0.50
OS_OVR_VA: 20/
OS_SPHERE: -0.25
OS_ADD: +2.50
OD_AXIS: 101
OD_VPRISM_DIRECTION: BF
OS_OVR_VA: 20/
OD_SPHERE: +0.25
OD_ADD: +2.50
OS_CYLINDER: -0.50

## 2025-02-10 ASSESSMENT — REFRACTION_MANIFEST
OD_AXIS: 090
OS_CYLINDER: -0.50
OS_ADD: +2.50
OS_SPHERE: +0.50
OD_VA2: 20/30+2
OD_ADD: +2.50
OS_VA2: 20/30-2
OU_VA: 20/30+2
OS_VA1: 20/30-2
OS_AXIS: 075
OD_VA1: 20/30+2
OD_CYLINDER: -0.75
OD_SPHERE: +0.75

## 2025-02-10 ASSESSMENT — VISUAL ACUITY
OD_BCVA: 20/20-1
OS_BCVA: 20/20-1

## 2025-02-10 ASSESSMENT — SUPERFICIAL PUNCTATE KERATITIS (SPK)
OS_SPK: ABSENT
OD_SPK: ABSENT

## 2025-02-10 ASSESSMENT — PUNCTA - ASSESSMENT
OS_PUNCTA: SIL PLUG LUL
OD_PUNCTA: SIL PLUG RUL

## 2025-02-26 ENCOUNTER — DOCTOR'S OFFICE (OUTPATIENT)
Dept: URBAN - NONMETROPOLITAN AREA CLINIC 1 | Facility: CLINIC | Age: 55
Setting detail: OPHTHALMOLOGY
End: 2025-02-26
Payer: COMMERCIAL

## 2025-02-26 DIAGNOSIS — H16.223: ICD-10-CM

## 2025-02-26 PROBLEM — H53.121 SUBJECTIVE VISUAL DISTURBANCE; RIGHT EYE: Status: ACTIVE | Noted: 2025-02-10

## 2025-02-26 PROCEDURE — 68761 CLOSE TEAR DUCT OPENING: CPT | Mod: 50 | Performed by: OPHTHALMOLOGY

## 2025-02-26 ASSESSMENT — REFRACTION_CURRENTRX
OD_SPHERE: +0.25
OS_SPHERE: -0.25
OD_OVR_VA: 20/
OS_CYLINDER: -0.50
OD_VPRISM_DIRECTION: BF
OD_AXIS: 101
OS_OVR_VA: 20/
OS_VPRISM_DIRECTION: BF
OS_AXIS: 073
OD_OVR_VA: 20/
OD_ADD: +2.50
OS_OVR_VA: 20/
OD_CYLINDER: -0.50
OS_ADD: +2.50

## 2025-02-26 ASSESSMENT — PUNCTA - ASSESSMENT
OS_PUNCTA: SIL PLUG LUL
OD_PUNCTA: SIL PLUG RUL

## 2025-02-26 ASSESSMENT — REFRACTION_MANIFEST
OS_SPHERE: +0.50
OD_AXIS: 090
OS_VA1: 20/30-2
OD_VA2: 20/30+2
OD_CYLINDER: -0.75
OS_VA2: 20/30-2
OS_AXIS: 075
OD_SPHERE: +0.75
OS_CYLINDER: -0.50
OD_ADD: +2.50
OS_ADD: +2.50
OD_VA1: 20/30+2
OU_VA: 20/30+2

## 2025-02-26 ASSESSMENT — SUPERFICIAL PUNCTATE KERATITIS (SPK)
OS_SPK: ABSENT
OD_SPK: ABSENT

## 2025-02-26 ASSESSMENT — TONOMETRY
OS_IOP_MMHG: 11
OD_IOP_MMHG: 12

## 2025-02-26 ASSESSMENT — VISUAL ACUITY
OD_BCVA: 20/70+2
OS_BCVA: 20/40+1

## 2025-02-26 ASSESSMENT — PACHYMETRY
OD_CT_UM: 576
OS_CT_CORRECTION: -2
OS_CT_UM: 574
OD_CT_CORRECTION: -2

## 2025-02-26 ASSESSMENT — REFRACTION_AUTOREFRACTION
OD_SPHERE: +0.75
OS_AXIS: 074
OD_CYLINDER: -0.75
OS_CYLINDER: -0.50
OD_AXIS: 057
OS_SPHERE: +0.25

## 2025-02-26 ASSESSMENT — CONFRONTATIONAL VISUAL FIELD TEST (CVF)
OS_FINDINGS: FULL
OD_FINDINGS: FULL

## 2025-03-05 ENCOUNTER — DOCTOR'S OFFICE (OUTPATIENT)
Dept: URBAN - NONMETROPOLITAN AREA CLINIC 1 | Facility: CLINIC | Age: 55
Setting detail: OPHTHALMOLOGY
End: 2025-03-05
Payer: COMMERCIAL

## 2025-03-05 DIAGNOSIS — H16.221: ICD-10-CM

## 2025-03-05 PROCEDURE — 68761 CLOSE TEAR DUCT OPENING: CPT | Mod: E3 | Performed by: OPHTHALMOLOGY

## 2025-03-05 ASSESSMENT — REFRACTION_MANIFEST
OU_VA: 20/30+2
OD_VA1: 20/30+2
OS_AXIS: 075
OS_VA1: 20/30-2
OD_AXIS: 090
OS_ADD: +2.50
OS_SPHERE: +0.50
OD_SPHERE: +0.75
OD_VA2: 20/30+2
OS_CYLINDER: -0.50
OD_CYLINDER: -0.75
OD_ADD: +2.50
OS_VA2: 20/30-2

## 2025-03-05 ASSESSMENT — REFRACTION_CURRENTRX
OD_VPRISM_DIRECTION: BF
OS_OVR_VA: 20/
OS_ADD: +2.50
OD_AXIS: 101
OD_CYLINDER: -0.50
OS_VPRISM_DIRECTION: BF
OD_OVR_VA: 20/
OS_AXIS: 073
OS_OVR_VA: 20/
OS_CYLINDER: -0.50
OS_SPHERE: -0.25
OD_SPHERE: +0.25
OD_ADD: +2.50
OD_OVR_VA: 20/

## 2025-03-05 ASSESSMENT — REFRACTION_AUTOREFRACTION
OS_CYLINDER: -0.50
OD_SPHERE: +0.75
OD_CYLINDER: -0.75
OS_SPHERE: +0.25
OS_AXIS: 074
OD_AXIS: 057

## 2025-03-05 ASSESSMENT — VISUAL ACUITY
OS_BCVA: 20/20
OD_BCVA: 20/30

## 2025-04-09 ENCOUNTER — RX ONLY (RX ONLY)
Age: 55
End: 2025-04-09

## 2025-04-09 ENCOUNTER — DOCTOR'S OFFICE (OUTPATIENT)
Dept: URBAN - NONMETROPOLITAN AREA CLINIC 1 | Facility: CLINIC | Age: 55
Setting detail: OPHTHALMOLOGY
End: 2025-04-09
Payer: COMMERCIAL

## 2025-04-09 DIAGNOSIS — H40.033: ICD-10-CM

## 2025-04-09 DIAGNOSIS — H16.221: ICD-10-CM

## 2025-04-09 DIAGNOSIS — H10.13: ICD-10-CM

## 2025-04-09 DIAGNOSIS — H35.373: ICD-10-CM

## 2025-04-09 DIAGNOSIS — H16.222: ICD-10-CM

## 2025-04-09 DIAGNOSIS — H43.393: ICD-10-CM

## 2025-04-09 PROCEDURE — 83861 MICROFLUID ANALY TEARS: CPT | Mod: LT | Performed by: OPHTHALMOLOGY

## 2025-04-09 PROCEDURE — 83861 MICROFLUID ANALY TEARS: CPT | Mod: RT | Performed by: OPHTHALMOLOGY

## 2025-04-09 PROCEDURE — 99213 OFFICE O/P EST LOW 20 MIN: CPT | Performed by: OPHTHALMOLOGY

## 2025-04-09 ASSESSMENT — SUPERFICIAL PUNCTATE KERATITIS (SPK)
OD_SPK: ABSENT
OS_SPK: ABSENT

## 2025-04-09 ASSESSMENT — REFRACTION_MANIFEST
OD_AXIS: 090
OS_AXIS: 075
OS_CYLINDER: -0.50
OD_CYLINDER: -0.75
OD_VA1: 20/30+2
OU_VA: 20/30+2
OS_VA2: 20/30-2
OD_VA2: 20/30+2
OS_SPHERE: +0.50
OD_SPHERE: +0.75
OS_ADD: +2.50
OD_ADD: +2.50
OS_VA1: 20/30-2

## 2025-04-09 ASSESSMENT — REFRACTION_AUTOREFRACTION
OS_AXIS: 087
OD_AXIS: 073
OD_CYLINDER: -1.00
OD_SPHERE: +0.75
OS_CYLINDER: -1.00
OS_SPHERE: +1.00

## 2025-04-09 ASSESSMENT — CONFRONTATIONAL VISUAL FIELD TEST (CVF)
OS_FINDINGS: FULL
OD_FINDINGS: FULL

## 2025-04-09 ASSESSMENT — TONOMETRY
OD_IOP_MMHG: 15
OS_IOP_MMHG: 16

## 2025-04-09 ASSESSMENT — REFRACTION_CURRENTRX
OS_AXIS: 073
OD_AXIS: 101
OD_OVR_VA: 20/
OD_OVR_VA: 20/
OS_OVR_VA: 20/
OS_CYLINDER: -0.50
OD_SPHERE: +0.25
OD_CYLINDER: -0.50
OS_OVR_VA: 20/
OD_ADD: +2.50
OS_ADD: +2.50
OS_SPHERE: -0.25
OS_VPRISM_DIRECTION: BF
OD_VPRISM_DIRECTION: BF

## 2025-04-09 ASSESSMENT — VISUAL ACUITY
OD_BCVA: 20/30-2
OS_BCVA: 20/25+1

## 2025-04-09 ASSESSMENT — PACHYMETRY
OD_CT_CORRECTION: -2
OS_CT_UM: 574
OS_CT_CORRECTION: -2
OD_CT_UM: 576

## 2025-04-09 ASSESSMENT — PUNCTA - ASSESSMENT
OS_PUNCTA: SIL PLUG LUL
OD_PUNCTA: SIL PLUG RUL

## 2025-07-15 ENCOUNTER — DOCTOR'S OFFICE (OUTPATIENT)
Dept: URBAN - NONMETROPOLITAN AREA CLINIC 1 | Facility: CLINIC | Age: 55
Setting detail: OPHTHALMOLOGY
End: 2025-07-15
Payer: COMMERCIAL

## 2025-07-15 DIAGNOSIS — H40.033: ICD-10-CM

## 2025-07-15 DIAGNOSIS — H16.223: ICD-10-CM

## 2025-07-15 DIAGNOSIS — H10.13: ICD-10-CM

## 2025-07-15 DIAGNOSIS — H53.121: ICD-10-CM

## 2025-07-15 PROCEDURE — 99213 OFFICE O/P EST LOW 20 MIN: CPT | Performed by: OPHTHALMOLOGY

## 2025-07-15 ASSESSMENT — REFRACTION_CURRENTRX
OS_OVR_VA: 20/
OS_VPRISM_DIRECTION: BF
OD_AXIS: 101
OD_VPRISM_DIRECTION: BF
OS_ADD: +2.50
OD_CYLINDER: -0.50
OD_OVR_VA: 20/
OD_SPHERE: +0.25
OS_OVR_VA: 20/
OD_ADD: +2.50
OS_SPHERE: -0.25
OS_AXIS: 073
OS_CYLINDER: -0.50
OD_OVR_VA: 20/

## 2025-07-15 ASSESSMENT — REFRACTION_AUTOREFRACTION
OD_AXIS: 040
OD_CYLINDER: -0.50
OS_SPHERE: 0.00
OS_AXIS: 086
OS_CYLINDER: -0.50
OD_SPHERE: +0.25

## 2025-07-15 ASSESSMENT — PUNCTA - ASSESSMENT
OS_PUNCTA: SIL PLUG LUL
OD_PUNCTA: SIL PLUG RUL

## 2025-07-15 ASSESSMENT — KERATOMETRY
OS_K1POWER_DIOPTERS: 45.00
OD_K2POWER_DIOPTERS: 46.50
OS_K2POWER_DIOPTERS: 46.00
OS_AXISANGLE_DEGREES: 004
OD_K1POWER_DIOPTERS: 46.00
OD_AXISANGLE_DEGREES: 152

## 2025-07-15 ASSESSMENT — PACHYMETRY
OS_CT_UM: 574
OD_CT_UM: 576
OS_CT_CORRECTION: -2
OD_CT_CORRECTION: -2

## 2025-07-15 ASSESSMENT — REFRACTION_MANIFEST
OD_AXIS: 090
OS_VA1: 20/30-2
OD_ADD: +2.50
OS_AXIS: 075
OD_CYLINDER: -0.75
OD_VA2: 20/30+2
OS_SPHERE: +0.50
OD_VA1: 20/30+2
OU_VA: 20/30+2
OS_CYLINDER: -0.50
OD_SPHERE: +0.75
OS_VA2: 20/30-2
OS_ADD: +2.50

## 2025-07-15 ASSESSMENT — CONFRONTATIONAL VISUAL FIELD TEST (CVF)
OD_FINDINGS: FULL
OS_FINDINGS: FULL

## 2025-07-15 ASSESSMENT — TONOMETRY
OS_IOP_MMHG: 13
OD_IOP_MMHG: 14

## 2025-07-15 ASSESSMENT — SUPERFICIAL PUNCTATE KERATITIS (SPK)
OS_SPK: ABSENT
OD_SPK: ABSENT

## 2025-07-15 ASSESSMENT — VISUAL ACUITY
OS_BCVA: 20/40
OD_BCVA: 20/60

## 2025-08-14 ENCOUNTER — DOCTOR'S OFFICE (OUTPATIENT)
Dept: URBAN - NONMETROPOLITAN AREA CLINIC 1 | Facility: CLINIC | Age: 55
Setting detail: OPHTHALMOLOGY
End: 2025-08-14
Payer: COMMERCIAL

## 2025-08-14 DIAGNOSIS — H43.811: ICD-10-CM

## 2025-08-14 DIAGNOSIS — H43.393: ICD-10-CM

## 2025-08-14 DIAGNOSIS — H16.223: ICD-10-CM

## 2025-08-14 DIAGNOSIS — H35.373: ICD-10-CM

## 2025-08-14 DIAGNOSIS — G44.1: ICD-10-CM

## 2025-08-14 PROCEDURE — 99214 OFFICE O/P EST MOD 30 MIN: CPT | Performed by: OPHTHALMOLOGY

## 2025-08-14 PROCEDURE — 92202 OPSCPY EXTND ON/MAC DRAW: CPT | Performed by: OPHTHALMOLOGY

## 2025-08-14 PROCEDURE — 92134 CPTRZ OPH DX IMG PST SGM RTA: CPT | Performed by: OPHTHALMOLOGY

## 2025-08-14 ASSESSMENT — KERATOMETRY
OS_AXISANGLE_DEGREES: 004
OD_K1POWER_DIOPTERS: 46.00
OS_K1POWER_DIOPTERS: 45.00
OD_K2POWER_DIOPTERS: 46.50
OS_K2POWER_DIOPTERS: 46.00
OD_AXISANGLE_DEGREES: 152

## 2025-08-14 ASSESSMENT — REFRACTION_CURRENTRX
OS_ADD: +2.50
OD_SPHERE: +0.25
OS_AXIS: 073
OD_CYLINDER: -0.50
OD_ADD: +2.50
OD_OVR_VA: 20/
OD_OVR_VA: 20/
OD_VPRISM_DIRECTION: BF
OS_SPHERE: -0.25
OS_OVR_VA: 20/
OS_OVR_VA: 20/
OD_AXIS: 101
OS_CYLINDER: -0.50
OS_VPRISM_DIRECTION: BF

## 2025-08-14 ASSESSMENT — REFRACTION_MANIFEST
OU_VA: 20/30+2
OS_VA2: 20/30-2
OD_ADD: +2.50
OS_CYLINDER: -0.50
OD_VA2: 20/30+2
OS_SPHERE: +0.50
OD_VA1: 20/30+2
OS_AXIS: 075
OS_VA1: 20/30-2
OD_SPHERE: +0.75
OS_ADD: +2.50
OD_CYLINDER: -0.75
OD_AXIS: 090

## 2025-08-14 ASSESSMENT — CONFRONTATIONAL VISUAL FIELD TEST (CVF)
OS_FINDINGS: FULL
OD_FINDINGS: FULL

## 2025-08-14 ASSESSMENT — REFRACTION_AUTOREFRACTION
OD_SPHERE: +0.25
OS_CYLINDER: -0.50
OD_CYLINDER: -0.50
OS_SPHERE: 0.00
OD_AXIS: 040
OS_AXIS: 086

## 2025-08-14 ASSESSMENT — SUPERFICIAL PUNCTATE KERATITIS (SPK)
OS_SPK: ABSENT
OD_SPK: ABSENT

## 2025-08-14 ASSESSMENT — VISUAL ACUITY
OD_BCVA: 20/40-1
OS_BCVA: 20/30